# Patient Record
Sex: MALE | Race: WHITE | NOT HISPANIC OR LATINO | Employment: OTHER | URBAN - METROPOLITAN AREA
[De-identification: names, ages, dates, MRNs, and addresses within clinical notes are randomized per-mention and may not be internally consistent; named-entity substitution may affect disease eponyms.]

---

## 2017-01-03 ENCOUNTER — OFFICE VISIT (OUTPATIENT)
Dept: SLEEP MEDICINE | Facility: CLINIC | Age: 52
End: 2017-01-03
Attending: FAMILY MEDICINE
Payer: COMMERCIAL

## 2017-01-03 VITALS
HEIGHT: 71 IN | SYSTOLIC BLOOD PRESSURE: 126 MMHG | OXYGEN SATURATION: 97 % | BODY MASS INDEX: 29.4 KG/M2 | DIASTOLIC BLOOD PRESSURE: 86 MMHG | HEART RATE: 92 BPM | WEIGHT: 210 LBS

## 2017-01-03 DIAGNOSIS — R06.00 DYSPNEA AND RESPIRATORY ABNORMALITY: Primary | ICD-10-CM

## 2017-01-03 DIAGNOSIS — G47.9 SLEEP DISTURBANCE: ICD-10-CM

## 2017-01-03 DIAGNOSIS — I25.10 ATHEROSCLEROSIS OF NATIVE CORONARY ARTERY OF NATIVE HEART WITHOUT ANGINA PECTORIS: ICD-10-CM

## 2017-01-03 DIAGNOSIS — R06.89 DYSPNEA AND RESPIRATORY ABNORMALITY: Primary | ICD-10-CM

## 2017-01-03 DIAGNOSIS — R53.83 MALAISE AND FATIGUE: ICD-10-CM

## 2017-01-03 DIAGNOSIS — R53.81 MALAISE AND FATIGUE: ICD-10-CM

## 2017-01-03 DIAGNOSIS — G47.39 OTHER SLEEP APNEA: ICD-10-CM

## 2017-01-03 PROBLEM — F51.04 PSYCHOPHYSIOLOGICAL INSOMNIA: Status: ACTIVE | Noted: 2017-01-03

## 2017-01-03 PROCEDURE — 99244 OFF/OP CNSLTJ NEW/EST MOD 40: CPT | Performed by: PHYSICIAN ASSISTANT

## 2017-01-03 NOTE — NURSING NOTE
"Chief Complaint   Patient presents with     Sleep Problem     Consult Dr. Reyez snoring, witnessed apneas, headaches       Initial Ht 1.803 m (5' 11\")  Wt 95.255 kg (210 lb)  BMI 29.30 kg/m2 Estimated body mass index is 29.3 kg/(m^2) as calculated from the following:    Height as of this encounter: 1.803 m (5' 11\").    Weight as of this encounter: 95.255 kg (210 lb).  BP completed using cuff size: large    "

## 2017-01-03 NOTE — PROGRESS NOTES
Sleep Consultation:    Date on this visit: 1/3/2017    Robbie Holley  is sent  by Jagdeep Reyez for a sleep consultation.     Primary Physician: Jagdeep Reyez     Chief Complaint   Patient presents with     Sleep Problem     Consult Dr. Reyez snoring, witnessed apneas, headaches     HPI: Robbie Holley is a 51 year old male with medical history remarkable for CAD, chronic pain, MELISSA, depression, insomnia,  erectile dysfunction and hyperlipidemia. He presents in clinic today for evaluation of possible obstructive sleep apnea.      Robbie goes to sleep at 10:00 PM during the week. He wakes up at 6:00 AM with an alarm. He falls asleep in 10 minutes after taking 10 mg of Ambien.  Robbie denies difficulty falling asleep.  He wakes up 5-6 times a night for 1-5 minutes before falling back to sleep.  Robbie wakes up to uncertain reasons.  On weekends, Robbie goes to sleep at 10:00 PM.  He wakes up at 6:00 AM with an alarm. He falls asleep in 10 minutes.  Patient gets an average of 8 hours of sleep per night. He does not feer refreshed in the mornings.      Patient does watch TV in bed and does not use electronics in bed and read in bed.     Robbie does not do shift work.      Robbie does snore every night and snoring is very loud. Patient does have a regular bed partner. There is report of snoring.  He does have witnessed apneas. They never sleep separately.  Patient sleeps on his side. He has frequent morning dry mouth and morning headaches, denies morning confusion and restless legs. Robbie denies any bruxism, sleep walking, sleep talking, dream enactment, sleep paralysis, cataplexy and hypnogogic/hypnopompic hallucinations.    Robbie denies difficulty breathing through his nose, claustrophobia and reflux at night.      Robbie has gained 20 pounds in the last year.  Patient describes himself as a morning person.  He would prefer to go to sleep at 10:00 PM and wake up at 7:00 AM.  Patient's [place] Sleepiness score 2/24 inconsistent  with severe daytime sleepiness.  He has fatigue.     Robbie does take naps. He takes some inadvertant naps.  He denies closing eyes, dozing and falling asleep while driving.  Patient was counseled on the importance of driving while alert, to pull over if drowsy, or nap before getting into the vehicle if sleepy.  He uses 2 cups/day of coffee. Last caffeine intake is usually before noon.      Allergies:    Allergies   Allergen Reactions     Nka [No Known Allergies]        Medications:    Current Outpatient Prescriptions   Medication Sig Dispense Refill     FLUoxetine (PROZAC) 20 MG capsule Take 3 capsules (60 mg) by mouth daily 90 capsule 1     [START ON 2/19/2017] clonazePAM (KLONOPIN) 1 MG tablet Take 0.5 in the morning and 1 tablet at night as needed.  OK to fill this once but he needs follow-up with office visit prior to additional refills. 45 tablet 0     IBUPROFEN PO Take 400 mg by mouth every 4 hours as needed for moderate pain       zolpidem (AMBIEN) 10 MG tablet Take 1 tablet (10 mg) by mouth nightly as needed for sleep 30 tablet 3     simvastatin (ZOCOR) 40 MG tablet Take 1 tablet (40 mg) by mouth At Bedtime 90 tablet 3     aspirin 81 MG tablet Take 1 tablet by mouth daily. 100 tablet 3     nitroglycerin (NITROQUICK) 0.4 MG SL tablet Place 1 tablet (0.4 mg) under the tongue every 5 minutes as needed for chest pain 25 tablet prn       Problem List:  Patient Active Problem List    Diagnosis Date Noted     Psychophysiological insomnia 01/03/2017     Priority: Medium     Chronic pain syndrome 10/28/2016     Priority: Medium     Patient is followed by Jagdeep Reyez MD for ongoing prescription of pain medication.  All refills should be approved by this provider, or covering partner.    Medication(s): narcotics discontinued.. Still using clonazepam for anxiety and sleep.  Maximum quantity per month:   Clinic visit frequency required: Q 6  months     Controlled substance agreement:  Encounter-Level CSA - 10/13/15:                Controlled Substance Agreement - Scan on 12/17/2015  1:23 PM : CONTROLLED SUBSTANCE AGREEMENT 10/13/2015 (below)            Pain Clinic evaluation in the past: No    DIRE Total Score(s):  No flowsheet data found.    Last [facility] website verification:  done on 12/28/2016   [email or web address]          Colon polyps 09/21/2016     Priority: Medium     Multiple large tubular adenoma. Repeat colonoscopy in 3 years. [place]       Depression, major, recurrent, moderate (H) 05/20/2016     Priority: Medium     Narcotic withdrawal (H) 04/19/2016     Priority: Medium     Made it through acute withdrawal.        Rash and nonspecific skin eruption 03/20/2014     Priority: Medium     OA (osteoarthritis) of knee 02/17/2014     Priority: Medium     Needs replacement but on prasugrel so delaying until off.  Still active though.  Using Percocet 5-325 three times daily in the meantime initially now akhil to 45/month.  Looking at hip replacement.         Tobacco use disorder 11/06/2013     Priority: Medium     Trying to quit again. Concerned about anxiety/depression. Nicotine replacement.  Winter 2016.       Cervical pain 08/19/2013     Priority: Medium     S/P prosthetic total arthroplasty of the hip  LEFT 12/19/2011     Priority: Medium     Generalized anxiety disorder 02/25/2011     Priority: Medium     Patient is followed by MCKENZIE ADLER for ongoing prescription of anxiety med.  Med: Clonazepam.   Maximum use per 2-3 months: 19  Expected duration: ??  Narcotic agreement on file: YES - initiated February 25, 2011 (see related phone message)  Clinic visit recommended: Q 6  Months (with myself or with mental health provider)  Side effect(s) with zoloft, citalpram, venlafaxine.  Cymbalta not tried.         Hyperlipidemia LDL goal <70 10/31/2010     Priority: Medium     LDL       62   1/21/2014  LDL      106   12/14/2011  LDL       83   3/5/2009   Restarting simvastatin and recheck 3 months.        LT hand  fingernail onychomycosis 02/25/2008     Priority: Medium     Unable to take antifungals orally due to concurrent zocor  May 9, 2008 - Topicals and/or Penlac.       Generalized hyperhidrosis 09/26/2006     Priority: Medium     ERECTILE DYSFUNCTION 02/02/2006     Priority: Medium     February 2, 2006 - risks reviewed.  Levitra trial.       Coronary atherosclerosis 01/30/2006     Priority: Medium     S/P MI  September 26, 2006 - Doing well.  EKG: ok.  August 10, 2007 - Stable.  Encouraged smoking cessation.   September 28, 2007 - quit smoking.  Repeat stress echo normal..   February 13, 2008 - Recheck labs and adjust as needed.     02/05/2013, S/P recent Lt Ht cath, Lt vent angio, thrombectomy and stenting of proximal circumflex artery with drug eluting stent (proximal circumflex artery found to be 99% stenosed), done at [facility] on 01/16/2013, followed now by Dr Loja cardiology, [facility].  Problem list name updated by automated process. Provider to review       Hyperlipidemia 01/30/2006     Priority: Medium     LDL      117   09/26/2006     Goal <70            ALT       32   09/26/2006                  HDL       38   09/26/2006     Goal >40             AST       28   09/26/2006                  TRIG      116   09/26/2006    Goal <150                                                  August 10, 2007 - redraw and increase dose if not w/in goal.        Problem list name updated by automated process. Provider to review       health care home 12/14/2011     Priority: Low     EMERGENCY CARE PLAN  June 11, 2013: No current Care Coordination follow up planned. Please refer if Care Coordination services are needed.    Presenting Problem Signs and Symptoms Treatment Plan   Questions or concerns   during clinic hours   I will call my clinic directly:  [address] [id number] [phone].   Questions or concerns outside clinic hours   I will call the 24 hour  nurse line at   [phone] or [phone].   Need to schedule an appointment   I will call the 24 hour scheduling team at [phone] or my clinic directly at [phone].    Same day treatment     I will call my clinic first, nurse line if after hours, urgent care and express care if needed.   Clinic care coordination services (regular clinic hours)   I will call a clinic care coordinator directly:     Terrance Ty RN  Mon, Tues, Fri - [phone]  Wed, Thurs - [phone]    Cristina Castellanos :    [phone]    Or call my clinic at [phone] and ask to speak with care coordination.   Crisis Services: Behavioral or Mental Health Crisis Connection 24 Hour Phone Line  [phone]    [facility] 24 Hour Crisis Services  [phone]    [facility] [phone]    [facility]   [phone]       Emergency treatment -- Immediately    CAll 911               Past Medical/Surgical History:  Past Medical History   Diagnosis Date     Myocardial infarction (H)      Past Surgical History   Procedure Laterality Date     Surgical history of -   3/10/03     Left knee meniscal tear repair     Surgical history of -        elbow      Surgical history of -        hernia     Surgical history of -   2001     cardiac stent     C stress echo (treadmill) fl  4/22/02     Hernia repair, inguinal rt/lt  11/20/06     Left inguinal hernia repair. Dr Garza     Arthroplasty hip  12/19/2011     Procedure:ARTHROPLASTY HIP; Left Total Hip Arthroplasty; Surgeon:CHELITA BANKS; Location:[facility] OR     Arthroscopy knee with lateral meniscectomy  3/1/2013     Procedure: ARTHROSCOPY KNEE WITH LATERAL MENISCECTOMY;  Right Knee Arthroscopy & Lateral Meniscectomy & Microfracture;  Surgeon: Chelita Banks MD;  Location: [facility] OR       Social History:  Social History     Social History     Marital Status:      Spouse Name: N/A     Number of Children: N/A     Years of Education: N/A      Occupational History     Not on file.     Social History Main Topics     Smoking status: Former Smoker -- 1.00 packs/day for 25 years     Types: Cigarettes     Quit date: 12/31/2015     Smokeless tobacco: Never Used      Comment: 9/1/07     Alcohol Use: 0.0 oz/week     0 Standard drinks or equivalent per week      Comment: twice monthly     Drug Use: No     Sexual Activity:     Partners: Female     Other Topics Concern     Parent/Sibling W/ Cabg, Mi Or Angioplasty Before 65f 55m? Yes     mother MI age 45     Social History Narrative       Family History:  Family History   Problem Relation Age of Onset     C.A.D. Mother      passed from MI-first MI age 45     DIABETES Mother      CEREBROVASCULAR DISEASE Mother      Hypertension Father      Psychotic Disorder Father      anxiety     Breast Cancer No family hx of      Cancer - colorectal No family hx of      Prostate Cancer No family hx of      Psychotic Disorder Brother      anxiety     Sleep Apnea       4 brothers       Review of Systems:  A complete review of systems reviewed by me is negative with the exeption of what has been mentioned in the history of present illness.  CONSTITUTIONAL:  POSITIVE for  weight gain  EYES: NEGATIVE for changes in vision, blind spots, double vision.  ENT: NEGATIVE for ear pain, sore throat, sinus pain, post-nasal drip, runny nose, bloody nose  CARDIAC:  POSITIVE for  Heart disease  NEUROLOGIC:  POSITIVE for  headaches and weakness or numbness in the arms or legs  DERMATOLOGIC: NEGATIVE for rashes, new moles or change in mole(s)  PULMONARY:  POSITIVE for  SOB with activity and productive cough  GASTROINTESTINAL: NEGATIVE for nausea or vomitting, loose or watery stools, fat or grease in stools, constipation, abdominal pain, bowel movements black in color or blood noted.  GENITOURINARY: NEGATIVE for pain during urination, blood in urine, urinating more frequently than usual, irregular menstrual periods.  MUSCULOSKELETAL:  POSITIVE  "for  muscle pain  ENDOCRINE: NEGATIVE for increased thirst or urination, diabetes.  LYMPHATIC: NEGATIVE for swollen lymph nodes, lumps or bumps in the breasts or nipple discharge.    Physical Examination:  Vitals: /86 mmHg  Pulse 92  Ht 1.803 m (5' 11\")  Wt 95.255 kg (210 lb)  BMI 29.30 kg/m2  SpO2 97%  BMI= Body mass index is 29.3 kg/(m^2).         [place] Total Score 1/3/2017   Total score - [place] 2       GENERAL APPEARANCE: alert and no distress  EYES: Eyes grossly normal to inspection, PERRL and conjunctivae and sclerae normal  HENT: ear canals and TM's normal, nose and mouth without ulcers or lesions, oropharynx crowded and tongue base enlarged  NECK: no adenopathy, no asymmetry, masses, or scars and thyroid normal to palpation  RESP: lungs clear to auscultation - no rales, rhonchi or wheezes  CV: regular rates and rhythm, normal S1 S2, no S3 or S4 and no murmur, click or rub  LYMPHATICS: normal ant/post cervical and supraclavicular nodes  MS: extremities normal- no gross deformities noted  NEURO: Normal strength and tone, mentation intact and speech normal  PSYCH: mentation appears normal and affect normal/bright  Mallampati Class: IV.  Tonsillar Stage:     Last Basic Metabolic Panel:  NA      140   9/6/2016   POTASSIUM      4.5   9/6/2016  CHLORIDE      110   9/6/2016  NATALYA      8.9   9/6/2016  CO2       21   9/6/2016  BUN       11   9/6/2016  CR     0.78   9/6/2016  GLC       77   10/12/2016    TSH   Date Value Ref Range Status   10/12/2016 1.86 0.40 - 4.00 mU/L Final   ]      Impression:    Robbie Holley is a 51 year old male with medical history remarkable for CAD, chronic pain, MELISSA, depression, insomnia, erectile dysfunction and hyperlipidemia. He presents in clinic today for evaluation of possible obstructive sleep apnea.    Patient has features and risk factors for possible obstructive sleep apnea including: loud snoring, witnessed apnea, non-refreshing sleep, daytime fatigue, difficulty " maintaining sleep, morning headaches,  crowded oropharynx, strong family history of obstructive sleep apnea and co-morbid CAD. The STOP-BANG core is 5/8.     Plan:    1. Schedule a Home Sleep Apnea Testing to evaluate for obstructive sleep apnea.    2. Advised him against drowsy driving.    3. Recommend weight management.     Literature provided regarding sleep apnea and sleep hygiene.      He will follow up with me in approximately one day after his sleep study has been completed to review the results and discuss plan of care.       Home Sleep Apnea Testing  reviewed.  Obstructive sleep apnea reviewed.  Complications of untreated sleep apnea were reviewed.    I have spent 60 minutes with this patient today in which greater than 50% of this time was spent in the counseling / coordination of care regarding SILVANO.    Kellie Grant PA-C    CC: Jagdeep Reyez

## 2017-01-03 NOTE — MR AVS SNAPSHOT
After Visit Summary   1/3/2017    Robbie Holley    MRN: [id number]           Patient Information     Date Of Birth          1965        Visit Information        Provider Department      1/3/2017 11:00 AM Kellie Grant PA [facility]        Today's Diagnoses     Dyspnea and respiratory abnormality    -  1     Sleep disturbance         Atherosclerosis of native coronary artery of native heart without angina pectoris [I25.10]         Other sleep apnea [G47.39]         Malaise and fatigue           Care Instructions      Your BMI is Body mass index is 29.3 kg/(m^2).  Weight management is a personal decision.  If you are interested in exploring weight loss strategies, the following discussion covers the approaches that may be successful. Body mass index (BMI) is one way to tell whether you are at a healthy weight, overweight, or obese. It measures your weight in relation to your height.  A BMI of 18.5 to 24.9 is in the healthy range. A person with a BMI of 25 to 29.9 is considered overweight, and someone with a BMI of 30 or greater is considered obese. More than two-thirds of American adults are considered overweight or obese.  Being overweight or obese increases the risk for further weight gain. Excess weight may lead to heart disease and diabetes.  Creating and following plans for healthy eating and physical activity may help you improve your health.  Weight control is part of healthy lifestyle and includes exercise, emotional health, and healthy eating habits. Careful eating habits lifelong are the mainstay of weight control. Though there are significant health benefits from weight loss, long-term weight loss with diet alone may be very difficult to achieve- studies show long-term success with dietary management in less than 10% of people. Attaining a healthy weight may be especially difficult to achieve in those with severe obesity. In some cases, medications, devices and  "surgical management might be considered.  What can you do?  If you are overweight or obese and are interested in methods for weight loss, you should discuss this with your provider.     Consider reducing daily calorie intake by 500 calories.     Keep a food journal.     Avoiding skipping meals, consider cutting portions instead.    Diet combined with exercise helps maintain muscle while optimizing fat loss. Strength training is particularly important for building and maintaining muscle mass. Exercise helps reduce stress, increase energy, and improves fitness. Increasing exercise without diet control, however, may not burn enough calories to loose weight.       Start walking three days a week 10-20 minutes at a time    Work towards walking thirty minutes five days a week     Eventually, increase the speed of your walking for 1-2 minutes at time    In addition, we recommend that you review healthy lifestyles and methods for weight loss available through the National Institutes of Health patient information sites:  http://win.niddk.nih.gov/publications/index.htm    And look into health and wellness programs that may be available through your health insurance provider, employer, local community center, or dano club.    Weight management plan: Patient was referred to their PCP to discuss a diet and exercise plan.  Provider : Kellie Grant  Contact Information: [facility] [phone]  [place] Points:  1. What is Obstructive Sleep Apnea (SILVANO)? SILVANO is the most common type of sleep apnea. Apnea literally means, \"without breath.\" It is characterized by repetitive pauses in breathing, despite continued effort to breathe, and is usually associated with a reduction in blood oxygen saturation. Apneas can last 10 to over 60 seconds. It is caused by narrowing or collapse of the upper airway as muscles relax during sleep. A number of things can make apnea worse, including: sleeping on your back, having alcohol in " the evening, smoking, asthma, allergies, nasal congestion, and weight gain.  2. What are the consequences of SILVANO? Symptoms include: daytime sleepiness- possibly increasing the risk of falling asleep while driving, unrefreshing/restless sleep, snoring, insomnia, waking frequently to urinate, waking with heartburn or reflux, reduced concentration and memory, and morning headaches. Other health consequences may include development of high blood pressure. Untreated SILVANO also can contribute to heart disease, stroke and diabetes.   3. What are the treatment options? In most situations, sleep apnea is a lifelong disease that must be managed with daily therapy. Continuous Positive Airway (CPAP) is the most reliable treatment. A mouthguard to hold your jaw forward is usually the next most reliable option. Other options include postioning devices (to keep you off your back), nasal valves, tongue retaining device, weight loss, surgery. There is more detail about these options toward the end of this document.  4. What are the most important things to remember about using CPAP?     WHERE CAN I FIND MORE INFORMATION?    American Academy of Sleep Medicine Patient information on sleep disorders:  http://yoursleep.aasmnet.org    CPAP-  WHY AND HOW?                                 Continuous positive airway pressure, or CPAP, is the most effective treatment for obstructive sleep apnea. It works by using air pressure to hold your throat open. A decision to use CPAP is a major step forward in the pursuit of a healthier life. The successful use of CPAP will help you breathe easier, sleep better and live healthier. Using CPAP can be a positive experience if you keep these vick points in mind:  1. Commitment  CPAP is not a quick fix for your problem. It involves a long-term commitment to improve your sleep and your health.    2. Communication  Stay in close communication with both your sleep doctor and your CPAP supplier. Ask lots of  "questions and seek help when you need it.    3. Consistency  Use CPAP all night, every night and for every nap. You will receive the maximum health benefits from CPAP when you use it every time that you sleep. This will also make it easier for your body to adjust to the treatment.    4. Correction  The first machine and mask that you try may not be the best ones for you. Work with your sleep doctor and your CPAP supplier to make corrections to your equipment selection. Ask about trying a different type of machine or mask if you have ongoing problems. Make sure that your mask is a good fit and learn to use your equipment properly.    5. Challenge  Tell a family member or close friend to ask you each morning if you used your CPAP the previous night. Have someone to challenge you to give it your best effort.    6. Connection   Your adjustment to CPAP will be easier if you are able to connect with others who use the same treatment. Ask your sleep doctor if there is a support group in your area for people who have sleep apnea, or look for one on the Internet.  7. Comfort   Increase your level of comfort by using a saline spray, decongestant or heated humidifier if CPAP irritates your nose, mouth or throat. Use your unit's \"ramp\" setting to slowly get used to the air pressure level. There may be soft pads you can buy that will fit over your mask straps. Look on www.CPAP.com for accessories such as these straps, a pillow contoured for side-sleeping with CPAP, longer hoses, hose covers to reduce condensation, or stands to keep the hose out of your way.                   8. Cleaning   Clean your mask, tubing and headgear on a regular basis. Put this time in your schedule so that you don't forget to do it. Check and replace the filters for your CPAP unit and humidifier.    9. Completion   Although you are never finished with CPAP therapy, you should reward yourself by celebrating the completion of your first month of " treatment. Expect this first month to be your hardest period of adjustment. It will involve some trial and error as you find the machine, mask and pressure settings that are right for you.    10. Continuation  After your first month of treatment, continue to make a daily commitment to use your CPAP all night, every night and for every nap.    CPAP-Tips to starting with success:  Begin using your CPAP for short periods of time during the day while you watch TV or read. This eliminates the pressure of trying to fall asleep with it when it is still a new sensation.    Use CPAP every night and for every nap. Using it less often reduces the health benefits and makes it harder for your body to get used to it.    Newer CPAP models are virtually silent; however, if you find the sound of your CPAP machine to be bothersome, place the unit under your bed to dampen the sound.     Make small adjustments to your mask, tubing, straps and headgear until you get the right fit. Tightening the mask may actually worsen the leak.  If it leaves significant marks on your face or irritates the bridge of your nose, it may not be the best mask for you.  Speak with the person who supplied the mask and consider trying other masks. Insurances will allow you to try different masks during the first month of starting CPAP.  Insurance also covers a new mask, hose and filter about every 3-6 months.    Use a saline nasal spray to ease mild nasal congestion. Neti-Pot or saline nasal rinses may also help. Nasal gel sprays can help reduce nasal dryness.  Biotene mouthwash can be helpful to protect your teeth if you experience frequent dry mouth.  Dry mouth may be a sign of air escaping out of your mouth or out of the mask in the case of a full face mask.  Speak with your provider if you expect that is the case.     Take a nasal decongestant to relieve more severe nasal or sinus congestion.  Do not use Afrin (oxymetazoline) nasal spray more than 3 days  in a row.  Speak with your sleep doctor if your nasal congestion is chronic.    Use a heated humidifier that fits your CPAP model to enhance your breathing comfort. Adjust the heat setting up if you get a dry nose or throat, down if you get condensation in the hose or mask.  Position the CPAP lower than you so that any condensation in the hose drains back into the machine rather than towards the mask.    Try a system that uses nasal pillows if traditional masks give you problems.    Clean your mask, tubing and headgear once a week. Make sure the equipment dries fully.    Regularly check and replace the filters for your CPAP unit and humidifier.    Work closely with your sleep doctor and your CPAP supplier to make sure that you have the machine, mask and air pressure setting that works best for you.    BESIDES CPAP, WHAT OTHER THERAPIES ARE THERE?    Postioning devices if you only have the snoring or apnea while on your back    Dental devices if your condition is mild    Nasal valves may be effective though experience is limited    Weight loss if you are overweight    Surgery in limited cases where devices are not acceptable or there are problems with structures in the nose and throat  If treated with one of these alternative options, further evaluation is necessary to ensure that the therapy is effective. This may require some form of repeat testing.    Healthy Lifestyle:  Healthy diet, exercise and limit alcohol: Not only will excessive alcohol increase your weight over time, but it irritates the throat tissues and make them swell, shrinking the airway and causing snoring. Drinking alcohol should be limited and stopped within 3-4 hours before going to bed.   Stop smoking: (Red swollen throat, heat, nicotine), also irritates and swells the airway, among numerous other negative health consequences.  Positioning Device  This example shows a pillow that straps around the waist. It may be appropriate for those whose  sleep study shows milder sleep apnea that occurs primarily when lying flat on one's back. Preliminary studies have shown benefit but effectiveness at home should be verified.                      Oral Appliance  These are examples of two of many custom-made devices that are more likely to work in mild sleep apnea                                                Oral appliances are dental mouth pieces that fit very much like a sports mouth guards or removable orthodontic retainers. They are used to treat snoring and obstructive sleep apnea . The device prevents the airway from collapsing by either holding the tongue or supporting the jaw in a forward position. Since oral appliances are non-invasive and easy to use, they may be considered as an early treatment option. Oral appliance therapy (OAT) involves the customization, selection, fabrication, fitting, adjustments and long-term follow-up care of specially designed oral devices, worn during sleep, which reposition the lower jaw and tongue base forward to maintain an open airway.  Custom made oral appliances are proven to be more effective than over-the-counter devices. Therefore, the over-the-counter devices are recommended not to be used as a screening tool nor as a therapeutic option.     Who gets a dental device?  Oral appliance therapy can be used as an alternative to CPAP therapy for the treatment of mild to moderate sleep apnea and for those patients who prefer OAT to CPAP. Oral appliance therapy is a first line therapy for the treatment of primary snoring. Additionally, OAT is an option for those that cannot tolerate CPAP as therapy or who have experienced insufficient surgical results.                 Possible side effects?  Frequent but minor side effects include: excessive salivation, dry mouth, discomfort of teeth and jaw and temporary changes in the patient s bite.  Potential complications include: jaw pain, permanent occlusal changes and TMJ  symptoms.  The above mentioned side effects and complications can be recognized and managed by dentists trained in dental sleep medicine.   Finding a dentist that practices dental sleep medicine  Specific training is available through the American Academy of Dental Sleep Medicine for dentists interested in working in the field of sleep. To find a dentist who is educated in the field of sleep and the use of oral appliances, near you, visit the Web site of the American Academy of Dental Sleep Medicine; also see http://www.accpstorage.org/newOrganization/patients/oralAppliances.pdf   To search for a dentist certified in these practices:  Http://aadsm.org/FindADentist.aspx?1  Http://www.accpstorage.org/newOrganization/patients/oralAppliances.pdf    Weight Loss:    Some patients may experience reduction or elimination of sleep apnea with weight loss.  Though there are significant health benefits from weight loss, long-term weight loss is very difficult to achieve- studies show success with dietary management in less that 10% of people.     If you are interested in dietary weight loss, you should review the options discussed at the National Institutes of Health patient information site:     Http:/www.health.nih.gov/topic/WeightLossDieting    Bariatric programs offer counseling in all methods of weight loss:    Http:/www.uofedicalcenter.org/Specialties/WeightLossSurgeryandMedicalMgmt/htm    Surgery:  There are a number of surgeries that have been attempted to treat apnea. In general, surgical options are usually reserved for cases in which there is a physical abnormality contributing to obstruction or other treatment options are ineffective or not tolerated. Most surgical options are either unreliable or quite invasive. One of the more common procedures is:  Uvulopalatopharyngoplasty: In this procedure, the uvula (the finger-like tissue that hangs in the back of the throat), part of the soft palate (the tissue that the  "uvula is attached to), and sometimes the tonsils or adenoids are removed. The efficacy of this surgery is around 30-50% .  After surgery, complications may include:  Sleepiness and sleep apnea related to post-surgery medication   Swelling, infection and bleeding   A sore throat and/or difficulty swallowing   Drainage of secretions into the nose and a nasal quality to the voice. English language speech does not seem to be affected by this surgery.   Narrowing of the airway in the nose and throat (hence constricting breathing) snoring and even iatrogenically caused sleep apnea. By cutting the tissues, excess scar tissue can \"tighten\" the airway and make it even smaller than it was before UPPP.  Patients who have had the uvula removed will become unable to correctly speak Macedonian or any other language that has a uvular 'r' phoneme.    Surgeries to help resolve nasal congestion may help reduce the severity of apnea slightly. Nasal congestion does not cause apnea on its own, so these surgeries are usually not performed just for SILVANO.  They may be worth considering if the nasal congestion is significantly bothersome independent of apnea.            Follow-ups after your visit        Your next 10 appointments already scheduled     Jan 11, 2017  3:00 PM   HST  with SLEEP LAB, BED FIVE   [facility] ([facility]    [address] [address]   [phone]            Jan 12, 2017 11:00 AM   HST Drop Off with SLEEP LAB, BED FIVE   [facility] ([facility])    [address] [address]   [phone]            Jan 17, 2017  1:00 PM   Return Sleep Patient with DANELLE Martin   [facility] ([facility])    [address] [address]   [phone]              Future tests that were ordered for you today     Open Future Orders        Priority Expected Expires Ordered    " "HST-HOME SLEEP TEST/TYPE 3 JUDI Routine  2017 1/3/2017            Who to contact     If you have questions or need follow up information about today's clinic visit or your schedule please contact [facility] directly at [phone].  Normal or non-critical lab and imaging results will be communicated to you by MyChart, letter or phone within 4 business days after the clinic has received the results. If you do not hear from us within 7 days, please contact the clinic through MyChart or phone. If you have a critical or abnormal lab result, we will notify you by phone as soon as possible.  Submit refill requests through [employer]image or call your pharmacy and they will forward the refill request to us. Please allow 3 business days for your refill to be completed.          Additional Information About Your Visit        MyChart Information     [employer]image lets you send messages to your doctor, view your test results, renew your prescriptions, schedule appointments and more. To sign up, go to [email or web address] . Click on \"Log in\" on the left side of the screen, which will take you to the Welcome page. Then click on \"Sign up Now\" on the right side of the page.     You will be asked to enter the access code listed below, as well as some personal information. Please follow the directions to create your username and password.     Your access code is: [email or web address]  Expires: 3/21/2017  4:09 PM     Your access code will  in 90 days. If you need help or a new code, please call your [place] clinic or [phone].        Care EveryWhere ID     This is your Care EveryWhere ID. This could be used by other organizations to access your [place] medical records  [id number]        Your Vitals Were     Pulse Height BMI (Body Mass Index) Pulse Oximetry          92 1.803 m (5' 11\") 29.30 kg/m2 97%         Blood Pressure from Last 3 Encounters:   17 130/92   16 116/86   10/12/16 124/86    Weight from " Last 3 Encounters:   01/03/17 95.255 kg (210 lb)   12/21/16 95.255 kg (210 lb)   09/09/16 95.255 kg (210 lb)              We Performed the Following     SLEEP EVALUATION & MANAGEMENT REFERRAL - ADULT        Primary Care Provider Office Phone # Fax #    Jagdeep Reyez [phone]       [facility] [address]  [place] [address]        Thank you!     Thank you for choosing [facility]  for your care. Our goal is always to provide you with excellent care. Hearing back from our patients is one way we can continue to improve our services. Please take a few minutes to complete the written survey that you may receive in the mail after your visit with us. Thank you!             Your Updated Medication List - Protect others around you: Learn how to safely use, store and throw away your medicines at www.disposemymeds.org.          This list is accurate as of: 1/3/17 11:48 AM.  Always use your most recent med list.                   Brand Name Dispense Instructions for use    aspirin 81 MG tablet     100 tablet    Take 1 tablet by mouth daily.       clonazePAM 1 MG tablet   Start taking on:  2/19/2017    klonoPIN    45 tablet    Take 0.5 in the morning and 1 tablet at night as needed.  OK to fill this once but he needs follow-up with office visit prior to additional refills.       FLUoxetine 20 MG capsule    PROzac    90 capsule    Take 3 capsules (60 mg) by mouth daily       IBUPROFEN PO      Take 400 mg by mouth every 4 hours as needed for moderate pain       nitroglycerin 0.4 MG sublingual tablet    NITROQUICK    25 tablet    Place 1 tablet (0.4 mg) under the tongue every 5 minutes as needed for chest pain       simvastatin 40 MG tablet    ZOCOR    90 tablet    Take 1 tablet (40 mg) by mouth At Bedtime       zolpidem 10 MG tablet    AMBIEN    30 tablet    Take 1 tablet (10 mg) by mouth nightly as needed for sleep

## 2017-01-03 NOTE — PATIENT INSTRUCTIONS
Your BMI is Body mass index is 29.3 kg/(m^2).  Weight management is a personal decision.  If you are interested in exploring weight loss strategies, the following discussion covers the approaches that may be successful. Body mass index (BMI) is one way to tell whether you are at a healthy weight, overweight, or obese. It measures your weight in relation to your height.  A BMI of 18.5 to 24.9 is in the healthy range. A person with a BMI of 25 to 29.9 is considered overweight, and someone with a BMI of 30 or greater is considered obese. More than two-thirds of American adults are considered overweight or obese.  Being overweight or obese increases the risk for further weight gain. Excess weight may lead to heart disease and diabetes.  Creating and following plans for healthy eating and physical activity may help you improve your health.  Weight control is part of healthy lifestyle and includes exercise, emotional health, and healthy eating habits. Careful eating habits lifelong are the mainstay of weight control. Though there are significant health benefits from weight loss, long-term weight loss with diet alone may be very difficult to achieve- studies show long-term success with dietary management in less than 10% of people. Attaining a healthy weight may be especially difficult to achieve in those with severe obesity. In some cases, medications, devices and surgical management might be considered.  What can you do?  If you are overweight or obese and are interested in methods for weight loss, you should discuss this with your provider.     Consider reducing daily calorie intake by 500 calories.     Keep a food journal.     Avoiding skipping meals, consider cutting portions instead.    Diet combined with exercise helps maintain muscle while optimizing fat loss. Strength training is particularly important for building and maintaining muscle mass. Exercise helps reduce stress, increase energy, and improves fitness.  "Increasing exercise without diet control, however, may not burn enough calories to loose weight.       Start walking three days a week 10-20 minutes at a time    Work towards walking thirty minutes five days a week     Eventually, increase the speed of your walking for 1-2 minutes at time    In addition, we recommend that you review healthy lifestyles and methods for weight loss available through the National Institutes of Health patient information sites:  http://win.niddk.nih.gov/publications/index.htm    And look into health and wellness programs that may be available through your health insurance provider, employer, local community center, or dano club.    Weight management plan: Patient was referred to their PCP to discuss a diet and exercise plan.  Provider : Kellie Grant  Contact Information: [facility] [phone]  [place] Points:  1. What is Obstructive Sleep Apnea (SILVANO)? SILVANO is the most common type of sleep apnea. Apnea literally means, \"without breath.\" It is characterized by repetitive pauses in breathing, despite continued effort to breathe, and is usually associated with a reduction in blood oxygen saturation. Apneas can last 10 to over 60 seconds. It is caused by narrowing or collapse of the upper airway as muscles relax during sleep. A number of things can make apnea worse, including: sleeping on your back, having alcohol in the evening, smoking, asthma, allergies, nasal congestion, and weight gain.  2. What are the consequences of SILVANO? Symptoms include: daytime sleepiness- possibly increasing the risk of falling asleep while driving, unrefreshing/restless sleep, snoring, insomnia, waking frequently to urinate, waking with heartburn or reflux, reduced concentration and memory, and morning headaches. Other health consequences may include development of high blood pressure. Untreated SILVANO also can contribute to heart disease, stroke and diabetes.   3. What are the treatment options? " In most situations, sleep apnea is a lifelong disease that must be managed with daily therapy. Continuous Positive Airway (CPAP) is the most reliable treatment. A mouthguard to hold your jaw forward is usually the next most reliable option. Other options include postioning devices (to keep you off your back), nasal valves, tongue retaining device, weight loss, surgery. There is more detail about these options toward the end of this document.  4. What are the most important things to remember about using CPAP?     WHERE CAN I FIND MORE INFORMATION?    American Academy of Sleep Medicine Patient information on sleep disorders:  http://yoursleep.aasmnet.org    CPAP-  WHY AND HOW?                                 Continuous positive airway pressure, or CPAP, is the most effective treatment for obstructive sleep apnea. It works by using air pressure to hold your throat open. A decision to use CPAP is a major step forward in the pursuit of a healthier life. The successful use of CPAP will help you breathe easier, sleep better and live healthier. Using CPAP can be a positive experience if you keep these vick points in mind:  1. Commitment  CPAP is not a quick fix for your problem. It involves a long-term commitment to improve your sleep and your health.    2. Communication  Stay in close communication with both your sleep doctor and your CPAP supplier. Ask lots of questions and seek help when you need it.    3. Consistency  Use CPAP all night, every night and for every nap. You will receive the maximum health benefits from CPAP when you use it every time that you sleep. This will also make it easier for your body to adjust to the treatment.    4. Correction  The first machine and mask that you try may not be the best ones for you. Work with your sleep doctor and your CPAP supplier to make corrections to your equipment selection. Ask about trying a different type of machine or mask if you have ongoing problems. Make sure that  "your mask is a good fit and learn to use your equipment properly.    5. Challenge  Tell a family member or close friend to ask you each morning if you used your CPAP the previous night. Have someone to challenge you to give it your best effort.    6. Connection   Your adjustment to CPAP will be easier if you are able to connect with others who use the same treatment. Ask your sleep doctor if there is a support group in your area for people who have sleep apnea, or look for one on the Internet.  7. Comfort   Increase your level of comfort by using a saline spray, decongestant or heated humidifier if CPAP irritates your nose, mouth or throat. Use your unit's \"ramp\" setting to slowly get used to the air pressure level. There may be soft pads you can buy that will fit over your mask straps. Look on www.CPAP.com for accessories such as these straps, a pillow contoured for side-sleeping with CPAP, longer hoses, hose covers to reduce condensation, or stands to keep the hose out of your way.                   8. Cleaning   Clean your mask, tubing and headgear on a regular basis. Put this time in your schedule so that you don't forget to do it. Check and replace the filters for your CPAP unit and humidifier.    9. Completion   Although you are never finished with CPAP therapy, you should reward yourself by celebrating the completion of your first month of treatment. Expect this first month to be your hardest period of adjustment. It will involve some trial and error as you find the machine, mask and pressure settings that are right for you.    10. Continuation  After your first month of treatment, continue to make a daily commitment to use your CPAP all night, every night and for every nap.    CPAP-Tips to starting with success:  Begin using your CPAP for short periods of time during the day while you watch TV or read. This eliminates the pressure of trying to fall asleep with it when it is still a new sensation.    Use CPAP " every night and for every nap. Using it less often reduces the health benefits and makes it harder for your body to get used to it.    Newer CPAP models are virtually silent; however, if you find the sound of your CPAP machine to be bothersome, place the unit under your bed to dampen the sound.     Make small adjustments to your mask, tubing, straps and headgear until you get the right fit. Tightening the mask may actually worsen the leak.  If it leaves significant marks on your face or irritates the bridge of your nose, it may not be the best mask for you.  Speak with the person who supplied the mask and consider trying other masks. Insurances will allow you to try different masks during the first month of starting CPAP.  Insurance also covers a new mask, hose and filter about every 3-6 months.    Use a saline nasal spray to ease mild nasal congestion. Neti-Pot or saline nasal rinses may also help. Nasal gel sprays can help reduce nasal dryness.  Biotene mouthwash can be helpful to protect your teeth if you experience frequent dry mouth.  Dry mouth may be a sign of air escaping out of your mouth or out of the mask in the case of a full face mask.  Speak with your provider if you expect that is the case.     Take a nasal decongestant to relieve more severe nasal or sinus congestion.  Do not use Afrin (oxymetazoline) nasal spray more than 3 days in a row.  Speak with your sleep doctor if your nasal congestion is chronic.    Use a heated humidifier that fits your CPAP model to enhance your breathing comfort. Adjust the heat setting up if you get a dry nose or throat, down if you get condensation in the hose or mask.  Position the CPAP lower than you so that any condensation in the hose drains back into the machine rather than towards the mask.    Try a system that uses nasal pillows if traditional masks give you problems.    Clean your mask, tubing and headgear once a week. Make sure the equipment dries  fully.    Regularly check and replace the filters for your CPAP unit and humidifier.    Work closely with your sleep doctor and your CPAP supplier to make sure that you have the machine, mask and air pressure setting that works best for you.    BESIDES CPAP, WHAT OTHER THERAPIES ARE THERE?    Postioning devices if you only have the snoring or apnea while on your back    Dental devices if your condition is mild    Nasal valves may be effective though experience is limited    Weight loss if you are overweight    Surgery in limited cases where devices are not acceptable or there are problems with structures in the nose and throat  If treated with one of these alternative options, further evaluation is necessary to ensure that the therapy is effective. This may require some form of repeat testing.    Healthy Lifestyle:  Healthy diet, exercise and limit alcohol: Not only will excessive alcohol increase your weight over time, but it irritates the throat tissues and make them swell, shrinking the airway and causing snoring. Drinking alcohol should be limited and stopped within 3-4 hours before going to bed.   Stop smoking: (Red swollen throat, heat, nicotine), also irritates and swells the airway, among numerous other negative health consequences.  Positioning Device  This example shows a pillow that straps around the waist. It may be appropriate for those whose sleep study shows milder sleep apnea that occurs primarily when lying flat on one's back. Preliminary studies have shown benefit but effectiveness at home should be verified.                      Oral Appliance  These are examples of two of many custom-made devices that are more likely to work in mild sleep apnea                                                Oral appliances are dental mouth pieces that fit very much like a sports mouth guards or removable orthodontic retainers. They are used to treat snoring and obstructive sleep apnea . The device prevents the  airway from collapsing by either holding the tongue or supporting the jaw in a forward position. Since oral appliances are non-invasive and easy to use, they may be considered as an early treatment option. Oral appliance therapy (OAT) involves the customization, selection, fabrication, fitting, adjustments and long-term follow-up care of specially designed oral devices, worn during sleep, which reposition the lower jaw and tongue base forward to maintain an open airway.  Custom made oral appliances are proven to be more effective than over-the-counter devices. Therefore, the over-the-counter devices are recommended not to be used as a screening tool nor as a therapeutic option.     Who gets a dental device?  Oral appliance therapy can be used as an alternative to CPAP therapy for the treatment of mild to moderate sleep apnea and for those patients who prefer OAT to CPAP. Oral appliance therapy is a first line therapy for the treatment of primary snoring. Additionally, OAT is an option for those that cannot tolerate CPAP as therapy or who have experienced insufficient surgical results.                 Possible side effects?  Frequent but minor side effects include: excessive salivation, dry mouth, discomfort of teeth and jaw and temporary changes in the patient s bite.  Potential complications include: jaw pain, permanent occlusal changes and TMJ symptoms.  The above mentioned side effects and complications can be recognized and managed by dentists trained in dental sleep medicine.   Finding a dentist that practices dental sleep medicine  Specific training is available through the American Academy of Dental Sleep Medicine for dentists interested in working in the field of sleep. To find a dentist who is educated in the field of sleep and the use of oral appliances, near you, visit the Web site of the American Academy of Dental Sleep Medicine; also see  http://www.accpstorage.org/newOrganization/patients/oralAppliances.pdf   To search for a dentist certified in these practices:  Http://aadsm.org/FindADentist.aspx?1  Http://www.accpstorage.org/newOrganization/patients/oralAppliances.pdf    Weight Loss:    Some patients may experience reduction or elimination of sleep apnea with weight loss.  Though there are significant health benefits from weight loss, long-term weight loss is very difficult to achieve- studies show success with dietary management in less that 10% of people.     If you are interested in dietary weight loss, you should review the options discussed at the National Institutes of Health patient information site:     Http:/www.health.nih.gov/topic/WeightLossDieting    Bariatric programs offer counseling in all methods of weight loss:    Http:/www.uofedicalcenter.org/Specialties/WeightLossSurgeryandMedicalMgmt/htm    Surgery:  There are a number of surgeries that have been attempted to treat apnea. In general, surgical options are usually reserved for cases in which there is a physical abnormality contributing to obstruction or other treatment options are ineffective or not tolerated. Most surgical options are either unreliable or quite invasive. One of the more common procedures is:  Uvulopalatopharyngoplasty: In this procedure, the uvula (the finger-like tissue that hangs in the back of the throat), part of the soft palate (the tissue that the uvula is attached to), and sometimes the tonsils or adenoids are removed. The efficacy of this surgery is around 30-50% .  After surgery, complications may include:  Sleepiness and sleep apnea related to post-surgery medication   Swelling, infection and bleeding   A sore throat and/or difficulty swallowing   Drainage of secretions into the nose and a nasal quality to the voice. English language speech does not seem to be affected by this surgery.   Narrowing of the airway in the nose and throat (hence  "constricting breathing) snoring and even iatrogenically caused sleep apnea. By cutting the tissues, excess scar tissue can \"tighten\" the airway and make it even smaller than it was before UPPP.  Patients who have had the uvula removed will become unable to correctly speak Uzbek or any other language that has a uvular 'r' phoneme.    Surgeries to help resolve nasal congestion may help reduce the severity of apnea slightly. Nasal congestion does not cause apnea on its own, so these surgeries are usually not performed just for SILVANO.  They may be worth considering if the nasal congestion is significantly bothersome independent of apnea.      "

## 2017-01-16 DIAGNOSIS — F51.02 TRANSIENT INSOMNIA: Primary | ICD-10-CM

## 2017-01-16 NOTE — TELEPHONE ENCOUNTER
Zolpidem 10mg      Last Written Prescription Date:  8/31/16  Last Fill Quantity: 30,   # refills: 3  Last Office Visit with [facility] or [facility] prescribing provider: 12/21/16  Future Office visit:       Routing refill request to provider for review/approval because:  Drug not on the [facility], [facility] or [facility] refill protocol or controlled substance    Jihan Guerrero CPhT  [id number]) [address] [place] [address]  Phone: [phone]  Fax: [phone]

## 2017-01-17 RX ORDER — ZOLPIDEM TARTRATE 10 MG/1
10 TABLET ORAL
Qty: 30 TABLET | Refills: 3 | Status: SHIPPED | OUTPATIENT
Start: 2017-01-17 | End: 2017-05-10

## 2017-01-17 NOTE — TELEPHONE ENCOUNTER
Pt called pharmacy asking to route to Dr Aissatou rosas. He is leaving on vacation Thursday and would like to  meds on Wednesday 1/18/17  Thank you  Stella Valles [facility]  [phone]

## 2017-01-19 ENCOUNTER — TELEPHONE (OUTPATIENT)
Dept: FAMILY MEDICINE | Facility: CLINIC | Age: 52
End: 2017-01-19

## 2017-01-19 NOTE — TELEPHONE ENCOUNTER
Received PA request for Zolpidem 10mg from the [facility]  Pharmacy Rejection Note: 76 Plan limitations Exceeded    Sig:Take 1 tablet (10 mg) by mouth nightly as needed for sleep  Disp: 30 per 30  JEANNINE: no    No previous PA on file for this med.    Dx: Transient insomnia [F51.02]   Rationale: Tx of Transient insomnia [F51.02]     Provided Ins: [employer]  Provided Ins ID: [id number]  Provided Ins Phone # [phone] [employer] online ins verification  Member num  [id number]  Carrier Num [id number]  Acct Num [id number]  Group Num [id number]    PA submitted to [email or web address] via [employer], [employer]ke [id number]    Kemal Jim RT (r)  [facility]

## 2017-01-20 NOTE — TELEPHONE ENCOUNTER
Received response from [facility] [facility]        Response provided to the pharmacy, sent to be scanned.    Kemal Jim RT (r)  [facility]

## 2017-02-14 DIAGNOSIS — F41.1 GENERALIZED ANXIETY DISORDER: ICD-10-CM

## 2017-02-14 NOTE — TELEPHONE ENCOUNTER
Clonazepam      Last Written Prescription Date:  12/21/2016  Last Fill Quantity: 45,   # refills: 0  Last Office Visit with [facility] or  Health prescribing provider: 01/03/2017  Future Office visit:       Routing refill request to provider for review/approval because:  Drug not on the [facility], [facility] or [facility] refill protocol or controlled substance    Mai Rubi, [facility]  Float Technician  [facility]

## 2017-02-16 RX ORDER — CLONAZEPAM 1 MG/1
TABLET ORAL
Qty: 45 TABLET | Refills: 0 | Status: SHIPPED | OUTPATIENT
Start: 2017-02-19 | End: 2017-03-30

## 2017-02-17 NOTE — TELEPHONE ENCOUNTER
Scripted walked over to the [facility].    Linda Fowler, [place]

## 2017-03-30 DIAGNOSIS — F33.1 DEPRESSION, MAJOR, RECURRENT, MODERATE (H): ICD-10-CM

## 2017-03-30 DIAGNOSIS — F41.1 GENERALIZED ANXIETY DISORDER: ICD-10-CM

## 2017-03-30 RX ORDER — CLONAZEPAM 1 MG/1
TABLET ORAL
Qty: 45 TABLET | Refills: 0 | Status: SHIPPED | OUTPATIENT
Start: 2017-04-29 | End: 2017-03-30

## 2017-03-30 RX ORDER — CLONAZEPAM 1 MG/1
TABLET ORAL
Qty: 45 TABLET | Refills: 0 | Status: SHIPPED | OUTPATIENT
Start: 2017-05-29 | End: 2017-07-14

## 2017-03-30 RX ORDER — CLONAZEPAM 1 MG/1
TABLET ORAL
Qty: 45 TABLET | Refills: 0 | Status: SHIPPED | OUTPATIENT
Start: 2017-03-30 | End: 2017-03-30

## 2017-03-30 NOTE — TELEPHONE ENCOUNTER
Prozac 20mg     Last Written Prescription Date: 02/16/17  Last Fill Quantity: 45, # refills: 0  Last Office Visit with [facility] primary care provider:  12/21/16        Last PHQ-9 score on record=   PHQ-9 SCORE 10/12/2016   Total Score -   Total Score 14

## 2017-03-30 NOTE — TELEPHONE ENCOUNTER
Klonopin 1mg      Last Written Prescription Date:  02/16/17  Last Fill Quantity: 45,   # refills: 0  Last Office Visit with [facility] or [facility] prescribing provider: 12/21/16  Future Office visit:       Routing refill request to provider for review/approval because:  Drug not on the [facility], [facility] or [facility] refill protocol or controlled substance      Thank you -  Deepika Webb, Pharmacy Technician  [facility]  On Behalf Of [facility]

## 2017-03-31 NOTE — TELEPHONE ENCOUNTER
Script for clonazepam walked over to the [facility] pharmacy.    Linda Fowler, [place]

## 2017-04-14 ENCOUNTER — TELEPHONE (OUTPATIENT)
Dept: FAMILY MEDICINE | Facility: CLINIC | Age: 52
End: 2017-04-14

## 2017-04-14 NOTE — TELEPHONE ENCOUNTER
Panel Management Review      Patient has the following on his problem list:     Depression / Dysthymia review  PHQ-9 SCORE 5/19/2016 8/31/2016 10/12/2016   Total Score 22 - -   Total Score - 18 14      Patient is due for:  PHQ9      IVD   ASA: Passed    Last LDL:    Lab Results   Component Value Date    CHOL 154 12/21/2016     Lab Results   Component Value Date    HDL 35 12/21/2016     Lab Results   Component Value Date    LDL 87 12/21/2016     Lab Results   Component Value Date    TRIG 161 12/21/2016        Lab Results   Component Value Date    CHOLHDLRATIO 4.0 01/21/2014        Is the patient on a Statin? YES   Is the patient on Aspirin? YES                  Medications     HMG CoA Reductase Inhibitors    simvastatin (ZOCOR) 40 MG tablet    Salicylates    aspirin 81 MG tablet          Last three blood pressure readings:  BP Readings from Last 3 Encounters:   01/03/17 126/86   12/21/16 116/86   10/12/16 124/86        Tobacco History:     History   Smoking Status     Former Smoker     Packs/day: 1.00     Years: 25.00     Types: Cigarettes     Quit date: 12/31/2015   Smokeless Tobacco     Never Used     Comment: 9/1/07         Composite cancer screening  Chart review shows that this patient is due/due soon for the following None  Summary:    Patient is due/failing the following:   PHQ9    Action needed:   Patient needs to do PHQ9.    Type of outreach:    Sent letter. with PHQ9 and addressed envelope to return it    Questions for provider review:    None                                                                                                                                    Nubia MEJÍA       Chart routed to none.

## 2017-04-14 NOTE — LETTER
[facility]  [address] [address]  [phone]      April 14, 2017      Robbie Kishan  [address] [place] [id number]        Dear Robbie,     As part of [facility]'s commitment to health and wellness we have recently reviewed your chart and your medical record indicates that you are due for one or more of the following:    -- Questionnaire for depression. Please fill out the enclosed questionnaire and send it back to us in the stamped envelope provided. These questions are about your depression and how you have been feeling in the last 2 weeks. We need this form updated in your chart in order to continue to fill your medication. The score of this questionnaire helps to determine if your medications are working well. Thank you in advance for filling it out.    Working together for your health is our goal.    Thank you for choosing [facility] for your healthcare needs.    Sincerely,     Jagdeep Reyez MD / jacob

## 2017-05-02 ASSESSMENT — ANXIETY QUESTIONNAIRES
7. FEELING AFRAID AS IF SOMETHING AWFUL MIGHT HAPPEN: NOT AT ALL
2. NOT BEING ABLE TO STOP OR CONTROL WORRYING: SEVERAL DAYS
IF YOU CHECKED OFF ANY PROBLEMS ON THIS QUESTIONNAIRE, HOW DIFFICULT HAVE THESE PROBLEMS MADE IT FOR YOU TO DO YOUR WORK, TAKE CARE OF THINGS AT HOME, OR GET ALONG WITH OTHER PEOPLE: NOT DIFFICULT AT ALL
6. BECOMING EASILY ANNOYED OR IRRITABLE: NOT AT ALL
5. BEING SO RESTLESS THAT IT IS HARD TO SIT STILL: NOT AT ALL
1. FEELING NERVOUS, ANXIOUS, OR ON EDGE: SEVERAL DAYS
3. WORRYING TOO MUCH ABOUT DIFFERENT THINGS: SEVERAL DAYS
GAD7 TOTAL SCORE: 3

## 2017-05-02 ASSESSMENT — PATIENT HEALTH QUESTIONNAIRE - PHQ9: 5. POOR APPETITE OR OVEREATING: NOT AT ALL

## 2017-05-03 ASSESSMENT — PATIENT HEALTH QUESTIONNAIRE - PHQ9: SUM OF ALL RESPONSES TO PHQ QUESTIONS 1-9: 3

## 2017-05-03 ASSESSMENT — ANXIETY QUESTIONNAIRES: GAD7 TOTAL SCORE: 3

## 2017-05-08 DIAGNOSIS — E78.5 HYPERLIPIDEMIA LDL GOAL <70: ICD-10-CM

## 2017-05-09 NOTE — TELEPHONE ENCOUNTER
Simvastatin  40mg     Last Written Prescription Date: 04/26/2016  #90 x 3  Last filled 02/13/2017  Last Office Visit with [facility], [facility] or [facility] prescribing provider: 12/21/2016 KYRA Reyez       Lab Results   Component Value Date    CHOL 154 12/21/2016     Lab Results   Component Value Date    HDL 35 12/21/2016     Lab Results   Component Value Date    LDL 87 12/21/2016     Lab Results   Component Value Date    TRIG 161 12/21/2016     Lab Results   Component Value Date    CHOLHDLRATIO 4.0 01/21/2014

## 2017-05-10 DIAGNOSIS — F51.02 TRANSIENT INSOMNIA: ICD-10-CM

## 2017-05-10 RX ORDER — SIMVASTATIN 40 MG
TABLET ORAL
Qty: 90 TABLET | Refills: 1 | Status: SHIPPED | OUTPATIENT
Start: 2017-05-10 | End: 2017-08-18

## 2017-05-10 NOTE — TELEPHONE ENCOUNTER
Zolpidem      Last Written Prescription Date:  01/17/2017  Last Fill Quantity: 30,   # refills: 3  Last Office Visit with [facility], [facility] or  Health prescribing provider: 01/03/2017  Future Office visit:       Routing refill request to provider for review/approval because:  Drug not on the [facility], [facility] or [facility] refill protocol or controlled substance    Mai Rubi, [facility]  Float Technician  [place] [facility]

## 2017-05-11 RX ORDER — ZOLPIDEM TARTRATE 10 MG/1
10 TABLET ORAL
Qty: 30 TABLET | Refills: 3 | Status: SHIPPED | OUTPATIENT
Start: 2017-05-11 | End: 2017-08-18

## 2017-05-12 NOTE — TELEPHONE ENCOUNTER
Script walked over to the [facility].    Linda Fowler, [facility]

## 2017-07-14 DIAGNOSIS — F41.1 GENERALIZED ANXIETY DISORDER: ICD-10-CM

## 2017-07-14 RX ORDER — CLONAZEPAM 1 MG/1
TABLET ORAL
Qty: 45 TABLET | Refills: 0 | Status: SHIPPED | OUTPATIENT
Start: 2017-07-14 | End: 2017-08-14

## 2017-07-14 NOTE — TELEPHONE ENCOUNTER
Clonazepam 1 mg tablet      Last Written Prescription Date:  03/30/17  Last Fill Quantity: 45,   # refills: 0  Last Office Visit with [facility] or  Health prescribing provider: 12/21/16  Future Office visit:       Routing refill request to provider for review/approval because:  Drug not on the [facility] or  Health refill protocol or controlled substance    Raiza Evans CPhT  On Behalf of [facility]

## 2017-07-17 NOTE — TELEPHONE ENCOUNTER
Called patient and I let him know his Rx was being walked over to our Pharmacy here at [place].  Linda Barkley  [facility]

## 2017-08-07 ENCOUNTER — TELEPHONE (OUTPATIENT)
Dept: FAMILY MEDICINE | Facility: CLINIC | Age: 52
End: 2017-08-07

## 2017-08-07 NOTE — TELEPHONE ENCOUNTER
Dr. Reyez, please address refill request. Patient was last prescribed this in 6/19/09.  Lindy Villatoro RN

## 2017-08-07 NOTE — TELEPHONE ENCOUNTER
Patient is overdue for follow-up appointment. Need to have discussion about risks, precautions, etc.

## 2017-08-07 NOTE — TELEPHONE ENCOUNTER
Pt called requesting refill of Levitra to  pharmacy.      Thank you,  Nubia Zavala, [facility]

## 2017-08-14 DIAGNOSIS — F41.1 GENERALIZED ANXIETY DISORDER: ICD-10-CM

## 2017-08-14 NOTE — TELEPHONE ENCOUNTER
Clonazepam 1mg      Last Written Prescription Date:  7/14/17   Last Fill Quantity: 45,   # refills: 0  Last Office Visit with [facility] or [facility] prescribing provider: 12/21/16  Future Office visit:    Next 5 appointments (look out 90 days)     Aug 18, 2017  3:30 PM CDT   SHORT with Jagdeep Reyez MD   [facility] ([facility]    [address] [place] [id number] [phone]                   Routing refill request to provider for review/approval because:  Drug not on the [facility], [facility] or [facility] refill protocol or controlled substance          Thanks,  Linda Vences, Technician (float)  [facility]

## 2017-08-16 RX ORDER — CLONAZEPAM 1 MG/1
TABLET ORAL
Qty: 45 TABLET | Refills: 0 | Status: SHIPPED | OUTPATIENT
Start: 2017-08-16 | End: 2017-08-18

## 2017-08-18 ENCOUNTER — OFFICE VISIT (OUTPATIENT)
Dept: FAMILY MEDICINE | Facility: CLINIC | Age: 52
End: 2017-08-18
Payer: COMMERCIAL

## 2017-08-18 VITALS
WEIGHT: 210 LBS | BODY MASS INDEX: 29.4 KG/M2 | HEART RATE: 80 BPM | TEMPERATURE: 96 F | DIASTOLIC BLOOD PRESSURE: 80 MMHG | SYSTOLIC BLOOD PRESSURE: 100 MMHG | HEIGHT: 71 IN

## 2017-08-18 DIAGNOSIS — F41.1 GENERALIZED ANXIETY DISORDER: ICD-10-CM

## 2017-08-18 DIAGNOSIS — F51.02 TRANSIENT INSOMNIA: ICD-10-CM

## 2017-08-18 DIAGNOSIS — M79.675 PAIN OF TOE OF LEFT FOOT: Primary | ICD-10-CM

## 2017-08-18 DIAGNOSIS — F17.200 TOBACCO USE DISORDER: ICD-10-CM

## 2017-08-18 DIAGNOSIS — N52.9 ERECTILE DYSFUNCTION, UNSPECIFIED ERECTILE DYSFUNCTION TYPE: ICD-10-CM

## 2017-08-18 DIAGNOSIS — E78.5 HYPERLIPIDEMIA LDL GOAL <70: ICD-10-CM

## 2017-08-18 PROCEDURE — 99214 OFFICE O/P EST MOD 30 MIN: CPT | Performed by: FAMILY MEDICINE

## 2017-08-18 RX ORDER — CLONAZEPAM 1 MG/1
TABLET ORAL
Qty: 45 TABLET | Refills: 0 | Status: SHIPPED | OUTPATIENT
Start: 2017-10-17 | End: 2018-01-03

## 2017-08-18 RX ORDER — ZOLPIDEM TARTRATE 10 MG/1
10 TABLET ORAL
Qty: 30 TABLET | Refills: 5 | Status: SHIPPED | OUTPATIENT
Start: 2017-08-18

## 2017-08-18 RX ORDER — CLONAZEPAM 1 MG/1
TABLET ORAL
Qty: 45 TABLET | Refills: 0 | Status: SHIPPED | OUTPATIENT
Start: 2017-09-17 | End: 2017-08-18

## 2017-08-18 RX ORDER — CLONAZEPAM 1 MG/1
TABLET ORAL
Qty: 45 TABLET | Refills: 0 | Status: SHIPPED | OUTPATIENT
Start: 2017-08-18 | End: 2017-08-18

## 2017-08-18 RX ORDER — VARDENAFIL HYDROCHLORIDE 20 MG/1
10-20 TABLET ORAL DAILY PRN
Qty: 12 TABLET | Refills: 11 | Status: SHIPPED | OUTPATIENT
Start: 2017-08-18 | End: 2022-07-07

## 2017-08-18 RX ORDER — SIMVASTATIN 40 MG
40 TABLET ORAL AT BEDTIME
Qty: 90 TABLET | Refills: 3 | Status: SHIPPED | OUTPATIENT
Start: 2017-08-18 | End: 2022-02-25

## 2017-08-18 RX ORDER — SILDENAFIL CITRATE 20 MG/1
TABLET ORAL
Qty: 15 TABLET | Refills: 11 | Status: SHIPPED | OUTPATIENT
Start: 2017-08-18

## 2017-08-18 NOTE — PATIENT INSTRUCTIONS
Your left toe likely has osteoarthritis.      Less flexible shoes and avoid flexion and extension will help.     Arch support like SuperFeet insoles. Corinne Mtn and TAYLOR. May help some.     Podiatry might be able to inject it.     6 months refills on medications and then required follow-up visit.  Can do e-visit or phone visit too if more convenient.    Look into the acupuncture for smoking cessation.     Let me know if you want to try nicotine patch, gum or lozenge.     Fasting lipid panel sometime in the next 4 months.     Rocker shoes like the [employer] brand may help.

## 2017-08-18 NOTE — NURSING NOTE
"No chief complaint on file.      Initial /80  Pulse 80  Temp 96  F (35.6  C) (Tympanic)  Ht 5' 11\" (1.803 m)  Wt 210 lb (95.3 kg)  BMI 29.29 kg/m2 Estimated body mass index is 29.29 kg/(m^2) as calculated from the following:    Height as of this encounter: 5' 11\" (1.803 m).    Weight as of this encounter: 210 lb (95.3 kg).  Medication Reconciliation: complete     Layla Wilcox MA      "

## 2017-08-18 NOTE — PROGRESS NOTES
SUBJECTIVE:   Robbie Holley is a 51 year old male who presents to clinic today for the following health issues:      Hyperlipidemia Follow-Up      Rate your low fat/cholesterol diet?: not monitoring fat    Taking statin?  Yes, no muscle aches from statin    Other lipid medications/supplements?:  None  Lab Results   Component Value Date    LDL 87 12/21/2016       Depression and Anxiety Follow-Up    Status since last visit: Improved     Other associated symptoms:None    Complicating factors:     Significant life event: No     Current substance abuse: None    PHQ-9 SCORE 8/31/2016 10/12/2016 5/2/2017   Total Score - - -   Total Score 18 14 3     MELISSA-7 SCORE 8/31/2016 10/12/2016 5/2/2017   Total Score - - -   Total Score 19 16 3       PHQ-9  English  PHQ-9   Any Language  GAD7        Amount of exercise or physical activity: 2-3 days/week for an average of 45-60 minutes    Problems taking medications regularly: No    Medication side effects: none  Diet: regular (no restrictions)          Problem list and histories reviewed & adjusted, as indicated.  Additional history: as documented        Reviewed and updated as needed this visit by clinical staffTobacco  Allergies  Meds  Med Hx  Surg Hx  Fam Hx  Soc Hx      Reviewed and updated as needed this visit by Provider         1. Pain of toe of left foot - many months. Injury in past. Worse with dorsiflexion. Squats a fair amount.  Not intermittnetly red or swollen but generallly enlarged.    2. Erectile dysfunction, unspecified erectile dysfunction type - ongiong and found Viagra works.  Would liek to use again.    3. Generalized anxiety disorder    4. Transient insomnia    5. Hyperlipidemia LDL goal <70 - doing well.    6. Tobacco use disorder - did quit after his heart attack. Interested in quitting again. Used Nicotrol but didn't like having that in his mouth all day. Possibly other options?       PMH: Updated and/or reviewed in chart.    PSH: Updated and/or reviewed  "in chart.    Family History: Updated and/or reviewed in chart.     ROS:  Constitutional, neuro, EMT, endocrine, pulmonary, cardiac, gastrointestinal, genitourinary, musculoskeletal, integument and psychiatric systems are otherwise negative.    OBJECTIVE:                                                    /80  Pulse 80  Temp 96  F (35.6  C) (Tympanic)  Ht 5' 11\" (1.803 m)  Wt 210 lb (95.3 kg)  BMI 29.29 kg/m2  GENERAL: Pleasant and interactive.  Alert and oriented x 3.  No acute distress.  PSYCH: Alert and oriented times 3; coherent speech, normal rate and volume, able to articulate logical thoughts, able to abstract reason, no tangential thoughts, no hallucinations or delusions  His affect is normal.  EXTREM: Great toe on effected foot has normal alignment and vascularity.  metatarsophalangeal joint is enlarged and tender. Minimal erythema.  Reduced range of motion and tender at extremes.   Results for orders placed or performed in visit on 12/21/16   Hepatitis C Screen Reflex to HCV RNA Quant and Genotype   Result Value Ref Range    Hepatitis C Antibody  NR     Nonreactive   Assay performance characteristics have not been established for newborns,   infants, and children     Drug abuse screen (NL, RW)   Result Value Ref Range    Methamphetamine Qual Urine Negative NEG    Cocaine Qual Urine Negative NEG    Cannabinoids Qual Urine Negative NEG    MDMA Qual Urine Negative NEG    Methadone Qual Urine Negative NEG    Opiates Qualitative Urine Negative NEG    Benzodiazepine Qual Urine Negative NEG    Tricyc Anti Qual Urine Negative NEG    Barbiturates Qual Urine Negative NEG    PCP Qual Urine Negative NEG    Amphetamine Qual Urine Negative NEG    Oxycodone Qual Urine Negative NEG   Lipid panel reflex to direct LDL   Result Value Ref Range    Cholesterol 154 <200 mg/dL    Triglycerides 161 (H) <150 mg/dL    HDL Cholesterol 35 (L) >39 mg/dL    LDL Cholesterol Calculated 87 <100 mg/dL    Non HDL Cholesterol 119 " <130 mg/dL      ASSESSMENT/PLAN:                                                        ICD-10-CM    1. Pain of toe of left foot M79.675 ORTHO  REFERRAL   2. Erectile dysfunction, unspecified erectile dysfunction type N52.9 sildenafil (REVATIO/VIAGRA) 20 MG tablet     vardenafil (LEVITRA) 20 MG tablet   3. Generalized anxiety disorder F41.1 clonazePAM (KLONOPIN) 1 MG tablet     DISCONTINUED: clonazePAM (KLONOPIN) 1 MG tablet     DISCONTINUED: clonazePAM (KLONOPIN) 1 MG tablet   4. Transient insomnia F51.02 zolpidem (AMBIEN) 10 MG tablet   5. Hyperlipidemia LDL goal <70 E78.5 simvastatin (ZOCOR) 40 MG tablet     Lipid panel reflex to direct LDL   6. Tobacco use disorder F17.200        Care plan updated in chart for chronic problems.    Patient Instructions     Your left toe likely has osteoarthritis.      Less flexible shoes and avoid flexion and extension will help.     Arch support like SuperFeet insoles. Gander Mtn and TAYLOR. May help some.     Podiatry might be able to inject it.     6 months refills on medications and then required follow-up visit.  Can do e-visit or phone visit too if more convenient.    Look into the acupuncture for smoking cessation.     Let me know if you want to try nicotine patch, gum or lozenge.     Fasting lipid panel sometime in the next 4 months.     Rocker shoes like the [employer] brand may help.      Orders Placed This Encounter     Lipid panel reflex to direct LDL     ORTHO  REFERRAL     sildenafil (REVATIO/VIAGRA) 20 MG tablet     vardenafil (LEVITRA) 20 MG tablet     DISCONTD: clonazePAM (KLONOPIN) 1 MG tablet     zolpidem (AMBIEN) 10 MG tablet     simvastatin (ZOCOR) 40 MG tablet     DISCONTD: clonazePAM (KLONOPIN) 1 MG tablet     clonazePAM (KLONOPIN) 1 MG tablet        See Patient Instructions    Jagdeep Ryeez MD

## 2017-08-18 NOTE — MR AVS SNAPSHOT
After Visit Summary   8/18/2017    Robbie Holley    MRN: [id number]           Patient Information     Date Of Birth          1965        Visit Information        Provider Department      8/18/2017 3:30 PM Jagdeep Reyez MD [facility]        Today's Diagnoses     Pain of toe of left foot    -  1    Erectile dysfunction, unspecified erectile dysfunction type        Generalized anxiety disorder        Transient insomnia        Hyperlipidemia LDL goal <70        Tobacco use disorder          Care Instructions      Your left toe likely has osteoarthritis.      Less flexible shoes and avoid flexion and extension will help.     Arch support like SuperFeet insoles. Gander Mtn and TAYLOR. May help some.     Podiatry might be able to inject it.     6 months refills on medications and then required follow-up visit.  Can do e-visit or phone visit too if more convenient.    Look into the acupuncture for smoking cessation.     Let me know if you want to try nicotine patch, gum or lozenge.     Fasting lipid panel sometime in the next 4 months.     Rocker shoes like the [employer]oka brand may help.           Follow-ups after your visit        Additional Services     ORTHO  REFERRAL       [facility] is referring you to the Orthopedic  Services at [facility].       The  Representative will assist you in the coordination of your Orthopedic and Musculoskeletal Care as prescribed by your physician.  Looking for injections of great toe, MTP and possibly insole.    The  Representative will call you within 1 business day to help schedule your appointment, or you may contact the  Representative at:    All areas ~ [phone]     Type of Referral : [facility]       Timeframe requested: routine.     Coverage of these services is subject to the terms and limitations of your health insurance plan.   "Please call member services at your health plan with any benefit or coverage questions.      If X-rays, CT or MRI's have been performed, please contact the facility where they were done to arrange for , prior to your scheduled appointment.  Please bring this referral request to your appointment and present it to your specialist.                  Follow-up notes from your care team     Return in about 6 months (around 2/18/2018).      Future tests that were ordered for you today     Open Future Orders        Priority Expected Expires Ordered    Lipid panel reflex to direct LDL Routine 8/19/2017 8/18/2018 8/18/2017            Who to contact     Normal or non-critical lab and imaging results will be communicated to you by [employer], letter or phone within 4 business days after the clinic has received the results. If you do not hear from us within 7 days, please contact the clinic through [employer] or phone. If you have a critical or abnormal lab result, we will notify you by phone as soon as possible.  Submit refill requests through [employer] or call your pharmacy and they will forward the refill request to us. Please allow 3 business days for your refill to be completed.          If you need to speak with a  for additional information , please call: [phone]           Additional Information About Your Visit        [employer] Information     [employer] lets you send messages to your doctor, view your test results, renew your prescriptions, schedule appointments and more. To sign up, go to [email or web address] . Click on \"Log in\" on the left side of the screen, which will take you to the Welcome page. Then click on \"Sign up Now\" on the right side of the page.     You will be asked to enter the access code listed below, as well as some personal information. Please follow the directions to create your username and password.     Your access code is: [email or web address]  Expires: 11/16/2017  4:14 PM     Your " "access code will  in 90 days. If you need help or a new code, please call your [place] clinic or [phone].        Care EveryWhere ID     This is your Care EveryWhere ID. This could be used by other organizations to access your [place] medical records  [id number]        Your Vitals Were     Pulse Temperature Height BMI (Body Mass Index)          80 96  F (35.6  C) (Tympanic) 5' 11\" (1.803 m) 29.29 kg/m2         Blood Pressure from Last 3 Encounters:   17 100/80   17 126/86   16 116/86    Weight from Last 3 Encounters:   17 210 lb (95.3 kg)   17 210 lb (95.3 kg)   16 210 lb (95.3 kg)              We Performed the Following     ORTHO  REFERRAL          Today's Medication Changes          These changes are accurate as of: 17  4:15 PM.  If you have any questions, ask your nurse or doctor.               Start taking these medicines.        Dose/Directions    clonazePAM 1 MG tablet   Commonly known as:  klonoPIN   Used for:  Generalized anxiety disorder   Started by:  Jagdeep Reyez MD        Start taking on:  10/17/2017   Take 0.5 in the morning and 1 tablet at night as needed.  OK to fill this once but he needs follow-up with office visit prior to additional refills.   Quantity:  45 tablet   Refills:  0       sildenafil 20 MG tablet   Commonly known as:  REVATIO/VIAGRA   Used for:  Erectile dysfunction, unspecified erectile dysfunction type   Started by:  Jagdeep Reyez MD        Take 1 tab prior to intercourse no more than once daily. May increase at subsequent doses by 1 tab with each subsequent ineffective dose up to a maximum dosage of 5 tablets.   Quantity:  15 tablet   Refills:  11       vardenafil 20 MG tablet   Commonly known as:  LEVITRA   Used for:  Erectile dysfunction, unspecified erectile dysfunction type   Started by:  Jagdeep Reyez MD        Dose:  10-20 mg   Take 0.5-1 tablets (10-20 mg) by mouth daily as needed for erectile " dysfunction Never use with nitroglycerin, terazosin or doxazosin.   Quantity:  12 tablet   Refills:  11         These medicines have changed or have updated prescriptions.        Dose/Directions    simvastatin 40 MG tablet   Commonly known as:  ZOCOR   This may have changed:  See the new instructions.   Used for:  Hyperlipidemia LDL goal <70   Changed by:  Jagdeep Reyez MD        Dose:  40 mg   Take 1 tablet (40 mg) by mouth At Bedtime   Quantity:  90 tablet   Refills:  3            Where to get your medicines      These medications were sent to [place], [place] - [address]  [address] [facility] [address]     Phone:  [phone]     simvastatin 40 MG tablet    vardenafil 20 MG tablet         Some of these will need a paper prescription and others can be bought over the counter.  Ask your nurse if you have questions.     Bring a paper prescription for each of these medications     clonazePAM 1 MG tablet    sildenafil 20 MG tablet    zolpidem 10 MG tablet                Primary Care Provider Office Phone # Fax #    Jagdeep Reyez [phone]       [address] [facility] [address]        Equal Access to Services     [facility]: Hadii aad ku hadasho Soomaali, waaxda luqadaha, qaybta kaalmada adenikkiyada, kaylin dietz . So [facility] [phone].    ATENCIÓN: Si habla español, tiene a jenkins disposición servicios gratuitos de asistencia lingüística. [facility] [phone].    We comply with applicable federal civil rights laws and [place] laws. We do not discriminate on the basis of race, color, national origin, age, disability sex, sexual orientation or gender identity.            Thank you!     Thank you for choosing [facility]  for your care. Our goal is always to provide you with excellent care. Hearing back from our patients is one way we can continue to improve our services. Please take a few  minutes to complete the written survey that you may receive in the mail after your visit with us. Thank you!             Your Updated Medication List - Protect others around you: Learn how to safely use, store and throw away your medicines at www.disposemymeds.org.          This list is accurate as of: 8/18/17  4:15 PM.  Always use your most recent med list.                   Brand Name Dispense Instructions for use Diagnosis    aspirin 81 MG tablet     100 tablet    Take 1 tablet by mouth daily.    Coronary atherosclerosis of unspecified type of vessel, native or graft       clonazePAM 1 MG tablet   Start taking on:  10/17/2017    klonoPIN    45 tablet    Take 0.5 in the morning and 1 tablet at night as needed.  OK to fill this once but he needs follow-up with office visit prior to additional refills.    Generalized anxiety disorder       IBUPROFEN PO      Take 400 mg by mouth every 4 hours as needed for moderate pain        nitroGLYcerin 0.4 MG sublingual tablet    NITROQUICK    25 tablet    Place 1 tablet (0.4 mg) under the tongue every 5 minutes as needed for chest pain    Coronary atherosclerosis of unspecified type of vessel, native or graft       sildenafil 20 MG tablet    REVATIO/VIAGRA    15 tablet    Take 1 tab prior to intercourse no more than once daily. May increase at subsequent doses by 1 tab with each subsequent ineffective dose up to a maximum dosage of 5 tablets.    Erectile dysfunction, unspecified erectile dysfunction type       simvastatin 40 MG tablet    ZOCOR    90 tablet    Take 1 tablet (40 mg) by mouth At Bedtime    Hyperlipidemia LDL goal <70       vardenafil 20 MG tablet    LEVITRA    12 tablet    Take 0.5-1 tablets (10-20 mg) by mouth daily as needed for erectile dysfunction Never use with nitroglycerin, terazosin or doxazosin.    Erectile dysfunction, unspecified erectile dysfunction type       zolpidem 10 MG tablet    AMBIEN    30 tablet    Take 1 tablet (10 mg) by mouth nightly as  needed for sleep    Transient insomnia

## 2017-11-30 ENCOUNTER — COMMUNICATION - HEALTHEAST (OUTPATIENT)
Dept: SCHEDULING | Facility: CLINIC | Age: 52
End: 2017-11-30

## 2017-12-01 ENCOUNTER — TRANSFERRED RECORDS (OUTPATIENT)
Dept: HEALTH INFORMATION MANAGEMENT | Facility: CLINIC | Age: 52
End: 2017-12-01

## 2017-12-01 ASSESSMENT — MIFFLIN-ST. JEOR: SCORE: 1850.04

## 2017-12-02 ASSESSMENT — MIFFLIN-ST. JEOR
SCORE: 1865.92
SCORE: 1879.98

## 2017-12-03 ASSESSMENT — MIFFLIN-ST. JEOR: SCORE: 1876.35

## 2017-12-04 ENCOUNTER — TRANSFERRED RECORDS (OUTPATIENT)
Dept: HEALTH INFORMATION MANAGEMENT | Facility: CLINIC | Age: 52
End: 2017-12-04

## 2017-12-04 ENCOUNTER — SURGERY - HEALTHEAST (OUTPATIENT)
Dept: CARDIOLOGY | Facility: CLINIC | Age: 52
End: 2017-12-04

## 2017-12-04 ASSESSMENT — MIFFLIN-ST. JEOR: SCORE: 1889.05

## 2017-12-18 ENCOUNTER — COMMUNICATION - HEALTHEAST (OUTPATIENT)
Dept: CARDIOLOGY | Facility: CLINIC | Age: 52
End: 2017-12-18

## 2017-12-18 DIAGNOSIS — I25.118 CORONARY ARTERY DISEASE OF NATIVE ARTERY OF NATIVE HEART WITH STABLE ANGINA PECTORIS (H): ICD-10-CM

## 2017-12-27 ENCOUNTER — COMMUNICATION - HEALTHEAST (OUTPATIENT)
Dept: CARDIOLOGY | Facility: CLINIC | Age: 52
End: 2017-12-27

## 2017-12-27 DIAGNOSIS — I25.118 CORONARY ARTERY DISEASE OF NATIVE ARTERY OF NATIVE HEART WITH STABLE ANGINA PECTORIS (H): ICD-10-CM

## 2017-12-28 ENCOUNTER — AMBULATORY - HEALTHEAST (OUTPATIENT)
Dept: CARDIOLOGY | Facility: CLINIC | Age: 52
End: 2017-12-28

## 2017-12-28 DIAGNOSIS — I25.10 CAD (CORONARY ARTERY DISEASE): ICD-10-CM

## 2017-12-28 DIAGNOSIS — I25.118 CORONARY ARTERY DISEASE OF NATIVE ARTERY OF NATIVE HEART WITH STABLE ANGINA PECTORIS (H): ICD-10-CM

## 2018-01-03 DIAGNOSIS — F41.1 GENERALIZED ANXIETY DISORDER: ICD-10-CM

## 2018-01-03 NOTE — TELEPHONE ENCOUNTER
Routing refill request to provider for review/approval because:  Drug not on the [facility] refill protocol     Odilia Flores RN        Will forward to pool as will need to be cosigned. Has been stable. Needs follow-up.

## 2018-01-12 RX ORDER — CLONAZEPAM 1 MG/1
TABLET ORAL
Qty: 45 TABLET | Refills: 0 | Status: SHIPPED | OUTPATIENT
Start: 2018-01-12

## 2018-02-01 DIAGNOSIS — F41.1 GENERALIZED ANXIETY DISORDER: ICD-10-CM

## 2018-02-01 RX ORDER — CLONAZEPAM 1 MG/1
TABLET ORAL
Qty: 45 TABLET | Refills: 0 | Status: CANCELLED | OUTPATIENT
Start: 2018-02-01

## 2018-02-02 NOTE — TELEPHONE ENCOUNTER
This was just filled on 1/12 by Dr Schroeder and there was a note that he needs a visit.  Please call pt, he needs to schedule    He is not even due for a refill until 2/10    Janette Zheng

## 2018-02-02 NOTE — TELEPHONE ENCOUNTER
Routing refill request to provider for review/approval because:  Drug not on the [facility] refill protocol  Lucero Hampton RN

## 2018-02-13 ENCOUNTER — COMMUNICATION - HEALTHEAST (OUTPATIENT)
Dept: CARDIOLOGY | Facility: CLINIC | Age: 53
End: 2018-02-13

## 2018-11-05 ENCOUNTER — ALLIED HEALTH/NURSE VISIT (OUTPATIENT)
Dept: FAMILY MEDICINE | Facility: CLINIC | Age: 53
End: 2018-11-05

## 2018-11-05 VITALS — SYSTOLIC BLOOD PRESSURE: 134 MMHG | DIASTOLIC BLOOD PRESSURE: 84 MMHG

## 2018-11-05 DIAGNOSIS — Z01.30 BP CHECK: Primary | ICD-10-CM

## 2018-11-05 PROCEDURE — 99207 ZZC NO CHARGE NURSE ONLY: CPT

## 2018-11-05 NOTE — MR AVS SNAPSHOT
"              After Visit Summary   2018    Robbie Holley    MRN: [id number]           Patient Information     Date Of Birth          1965        Visit Information        Provider Department      2018 1:18 PM [facility] [place]        Today's Diagnoses     BP check    -  1       Follow-ups after your visit        Who to contact     Normal or non-critical lab and imaging results will be communicated to you by [email or web address], letter or phone within 4 business days after the clinic has received the results. If you do not hear from us within 7 days, please contact the clinic through MyChart or phone. If you have a critical or abnormal lab result, we will notify you by phone as soon as possible.  Submit refill requests through [employer] or call your pharmacy and they will forward the refill request to us. Please allow 3 business days for your refill to be completed.          If you need to speak with a  for additional information , please call: [phone]           Additional Information About Your Visit        [employer] Information     [employer] lets you send messages to your doctor, view your test results, renew your prescriptions, schedule appointments and more. To sign up, go to [email or web address] . Click on \"Log in\" on the left side of the screen, which will take you to the Welcome page. Then click on \"Sign up Now\" on the right side of the page.     You will be asked to enter the access code listed below, as well as some personal information. Please follow the directions to create your username and password.     Your access code is: [email or web address]  Expires: 2/3/2019  1:22 PM     Your access code will  in 90 days. If you need help or a new code, please call your [place] clinic or [phone].        Care EveryWhere ID     This is your Care EveryWhere ID. This could be used by other organizations to access your [place] medical records  [id number]   "       Blood Pressure from Last 3 Encounters:   11/05/18 134/84   08/18/17 100/80   01/03/17 126/86    Weight from Last 3 Encounters:   08/18/17 210 lb (95.3 kg)   01/03/17 210 lb (95.3 kg)   12/21/16 210 lb (95.3 kg)              Today, you had the following     No orders found for display       Primary Care Provider    None Specified       No primary provider on file.        Equal Access to Services     OUMAR FLORES : Hadii luis melendrezo Sojessie, waaxda luqadaha, qaybta kaalmada adeegyada, kaylin dietz . So [facility] [phone].    ATENCIÓN: Si habla esppaige, tiene a jenkins disposición servicios gratuitos de asistencia lingüística. Llame al [phone].    We comply with applicable federal civil rights laws and [place] laws. We do not discriminate on the basis of race, color, national origin, age, disability, sex, sexual orientation, or gender identity.            Thank you!     Thank you for choosing [facility]  for your care. Our goal is always to provide you with excellent care. Hearing back from our patients is one way we can continue to improve our services. Please take a few minutes to complete the written survey that you may receive in the mail after your visit with us. Thank you!             Your Updated Medication List - Protect others around you: Learn how to safely use, store and throw away your medicines at www.disposemymeds.org.          This list is accurate as of 11/5/18  1:22 PM.  Always use your most recent med list.                   Brand Name Dispense Instructions for use Diagnosis    aspirin 81 MG tablet     100 tablet    Take 1 tablet by mouth daily.    Coronary atherosclerosis of unspecified type of vessel, native or graft       clonazePAM 1 MG tablet    klonoPIN    45 tablet    Take 0.5 in the morning and 1 tablet at night as needed.  OK to fill this once but he needs follow-up with office visit prior to additional refills.    Generalized anxiety  disorder       IBUPROFEN PO      Take 400 mg by mouth every 4 hours as needed for moderate pain        nitroGLYcerin 0.4 MG sublingual tablet    NITROQUICK    25 tablet    Place 1 tablet (0.4 mg) under the tongue every 5 minutes as needed for chest pain    Coronary atherosclerosis of unspecified type of vessel, native or graft       sildenafil 20 MG tablet    REVATIO    15 tablet    Take 1 tab prior to intercourse no more than once daily. May increase at subsequent doses by 1 tab with each subsequent ineffective dose up to a maximum dosage of 5 tablets.    Erectile dysfunction, unspecified erectile dysfunction type       simvastatin 40 MG tablet    ZOCOR    90 tablet    Take 1 tablet (40 mg) by mouth At Bedtime    Hyperlipidemia LDL goal <70       vardenafil 20 MG tablet    LEVITRA    12 tablet    Take 0.5-1 tablets (10-20 mg) by mouth daily as needed for erectile dysfunction Never use with nitroglycerin, terazosin or doxazosin.    Erectile dysfunction, unspecified erectile dysfunction type       zolpidem 10 MG tablet    AMBIEN    30 tablet    Take 1 tablet (10 mg) by mouth nightly as needed for sleep    Transient insomnia

## 2018-11-05 NOTE — PROGRESS NOTES
Robbie Holley is enrolled/participating in the retail pharmacy Blood Pressure Goals Achievement Program (BPGAP).  Robbie Holley was evaluated at [facility] on November 5, 2018 at which time his blood pressure was:    BP Readings from Last 3 Encounters:   11/05/18 134/84   08/18/17 100/80   01/03/17 126/86     Reviewed lifestyle modifications for blood pressure control and reduction: including making healthy food choices, managing weight, getting regular exercise, smoking cessation, reducing alcohol consumption, monitoring blood pressure regularly.     Robbie Holley is not experiencing symptoms.    Follow-Up: BP is at goal of < 140/90mmHg (patient 18+ years of age with or without diabetes).  Recommended follow-up at pharmacy in 6 months.     Recommendation to Provider: none    Robbie Holley was evaluated for enrollment into the PGEN study today.    PLEASE INITIATE ENROLLMENT DISCUSSION WITH HTN PTS  1) Between 30-80 years old                                                                                                               2) BMI between 19-50                                                                                                        3) BP ?140/90 AND ?170/110 patients aged 30-59         BP ?150/90 AND ?170/110 non-diabetic patients aged 60-80       BP ?140/90 AND ?170/110 diabetic patients aged 60-80  4) Additional requirements for uncontrolled HTN patients:        Pt on only 1 class of medication  5) EXCLUDE patient if confirmation of:                  ? Cardiac disease                  ? Chronic Kidney Disease                  ? Pregnancy/Breastfeeding                  ? Secondary Hypertension/Pre-eclampsia                                 ? Vascular disease    Patient eligible for enrollment:  No  Patient interested in enrollment:  No    This note completed by: Harjinder Weaver, Pharmacist  [facility]

## 2019-08-05 ENCOUNTER — COMMUNICATION - HEALTHEAST (OUTPATIENT)
Dept: CARDIOLOGY | Facility: CLINIC | Age: 54
End: 2019-08-05

## 2019-08-05 DIAGNOSIS — I25.118 CORONARY ARTERY DISEASE OF NATIVE ARTERY OF NATIVE HEART WITH STABLE ANGINA PECTORIS (H): ICD-10-CM

## 2020-11-06 ENCOUNTER — NURSE TRIAGE (OUTPATIENT)
Dept: NURSING | Facility: CLINIC | Age: 55
End: 2020-11-06

## 2020-11-06 NOTE — TELEPHONE ENCOUNTER
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  Using a splint, but pain but symptoms continue on and off, and has soft spot near his umbillica that bulges when he coughs.    Prefers to go right to the \"hernia doctor\" rather than his PCP, but willing to make telephone visit with his PCP.  Transferred patient to scheduling.    Yazmin Santiago RN  [facility]      Reason for Disposition    [1] New-onset hernia suspected (reducible bulge in groin or abdomen; non-tender) AND [2] NO pain or vomiting    Additional Information    Negative: SEVERE abdominal pain    Negative: Hernia is painful or tender to touch    Negative: [1] Vomiting AND [2] can't reduce the hernia    Negative: [1] Vomiting AND [2] abdomen looks much more swollen than usual    Negative: [1] Swollen lump in groin AND [2] pulsating (like heartbeat)    Negative: Patient sounds very sick or weak to the triager    Negative: [1] Constant abdominal pain AND [2] present > 2 hours  (NO pain or tenderness of hernia)    Negative: Can't reduce the hernia (NO pain, local tenderness, or vomiting)    Protocols used: HERNIA-A-AH      "

## 2020-11-09 ENCOUNTER — VIRTUAL VISIT (OUTPATIENT)
Dept: FAMILY MEDICINE | Facility: CLINIC | Age: 55
End: 2020-11-09
Payer: COMMERCIAL

## 2020-11-09 DIAGNOSIS — K42.9 UMBILICAL HERNIA WITHOUT OBSTRUCTION AND WITHOUT GANGRENE: Primary | ICD-10-CM

## 2020-11-09 PROCEDURE — 99213 OFFICE O/P EST LOW 20 MIN: CPT | Mod: 95 | Performed by: PHYSICIAN ASSISTANT

## 2020-11-09 NOTE — PROGRESS NOTES
"Robbie Holley is a 54 year old male who is being evaluated via a billable telephone visit.      The patient has been notified of following:     \"This telephone visit will be conducted via a call between you and your physician/provider. We have found that certain health care needs can be provided without the need for a physical exam.  This service lets us provide the care you need with a short phone conversation.  If a prescription is necessary we can send it directly to your pharmacy.  If lab work is needed we can place an order for that and you can then stop by our lab to have the test done at a later time.    Telephone visits are billed at different rates depending on your insurance coverage. During this emergency period, for some insurers they may be billed the same as an in-person visit.  Please reach out to your insurance provider with any questions.    If during the course of the call the physician/provider feels a telephone visit is not appropriate, you will not be charged for this service.\"    Patient has given verbal consent for Telephone visit?  Yes    What phone number would you like to be contacted at? [phone]    How would you like to obtain your AVS? MyChart    Subjective     Robbie Holley is a 54 year old male who presents via phone visit today for the following health issues:    HPI     Concern - Possible Hernia   Onset: 2 months  Description: Pt was having sore muscles in abdomen on and off, noticed a bulge X 2 weeks.     At the top of the belly button, \"soft spot\"  Bulge with coughing x1 week  Tore some abdominal muscles ~2 months ago  Has had numerous hernias in the past, has mesh intact        Review of Systems   Constitutional, MSK, GI systems are negative, except as otherwise noted.       Objective          Vitals:  No vitals were obtained today due to virtual visit.    healthy, alert and no distress  PSYCH: Alert and oriented times 3; coherent speech, normal   rate and volume, able to " articulate logical thoughts, able   to abstract reason, no tangential thoughts, no hallucinations   or delusions  His affect is normal and pleasant  RESP: No cough, no audible wheezing, able to talk in full sentences  Remainder of exam unable to be completed due to telephone visits        Assessment/Plan:    Assessment & Plan     1. Umbilical hernia without obstruction and without gangrene        Referral to general surgery for further evaluation and likely repair. Discussed signs/symptoms that would warrant an ER visit.        Return for follow up with general surgery.    Patsy Abraham PA-C  [facility]    Phone call duration:  7 minutes

## 2020-12-15 ENCOUNTER — OFFICE VISIT (OUTPATIENT)
Dept: SURGERY | Facility: CLINIC | Age: 55
End: 2020-12-15
Attending: PHYSICIAN ASSISTANT
Payer: COMMERCIAL

## 2020-12-15 VITALS
TEMPERATURE: 97.7 F | HEIGHT: 71 IN | DIASTOLIC BLOOD PRESSURE: 104 MMHG | SYSTOLIC BLOOD PRESSURE: 147 MMHG | HEART RATE: 96 BPM | WEIGHT: 220 LBS | BODY MASS INDEX: 30.8 KG/M2

## 2020-12-15 DIAGNOSIS — E66.811 CLASS 1 OBESITY DUE TO EXCESS CALORIES WITHOUT SERIOUS COMORBIDITY WITH BODY MASS INDEX (BMI) OF 30.0 TO 30.9 IN ADULT: ICD-10-CM

## 2020-12-15 DIAGNOSIS — E66.09 CLASS 1 OBESITY DUE TO EXCESS CALORIES WITHOUT SERIOUS COMORBIDITY WITH BODY MASS INDEX (BMI) OF 30.0 TO 30.9 IN ADULT: ICD-10-CM

## 2020-12-15 DIAGNOSIS — M62.08 DIASTASIS RECTI: Primary | ICD-10-CM

## 2020-12-15 DIAGNOSIS — K42.9 UMBILICAL HERNIA WITHOUT OBSTRUCTION AND WITHOUT GANGRENE: ICD-10-CM

## 2020-12-15 DIAGNOSIS — F17.200 TOBACCO DEPENDENCE: ICD-10-CM

## 2020-12-15 PROCEDURE — 99244 OFF/OP CNSLTJ NEW/EST MOD 40: CPT | Performed by: SURGERY

## 2020-12-15 RX ORDER — ATORVASTATIN CALCIUM 40 MG/1
TABLET, FILM COATED ORAL
COMMUNITY
Start: 2020-10-22 | End: 2022-02-25

## 2020-12-15 RX ORDER — METOPROLOL SUCCINATE 50 MG/1
50 TABLET, EXTENDED RELEASE ORAL
COMMUNITY
Start: 2020-10-22 | End: 2022-09-22

## 2020-12-15 ASSESSMENT — MIFFLIN-ST. JEOR: SCORE: 1855.04

## 2020-12-15 NOTE — NURSING NOTE
"Initial BP (!) 147/104 (BP Location: Right arm, Patient Position: Sitting, Cuff Size: Adult Regular)   Pulse 96   Temp 97.7  F (36.5  C) (Tympanic)   Ht 1.803 m (5' 11\")   Wt 99.8 kg (220 lb)   BMI 30.68 kg/m   Estimated body mass index is 30.68 kg/m  as calculated from the following:    Height as of this encounter: 1.803 m (5' 11\").    Weight as of this encounter: 99.8 kg (220 lb). .    Shaneka Beltran MA    "

## 2020-12-15 NOTE — LETTER
"    12/15/2020         RE: Robbie Holley  [address] [address]        Dear Colleague,    Thank you for referring your patient, Robbie Holley, to the [facility]. Please see a copy of my visit note below.    Surgical Consultation/History and Physical  [facility]    Robbie is seen in consultation for umbilical hernia, at the request of Physician No Ref-Primary.    Chief Complaint:  Umbilical hernia    History of Present Illness: Robbie Holley is a 54 year old male presents with umbilical hernia.  Patient notes a 2 month history of \"torn muscles\" after lifting significant weight.  He admits a bulge at his belly button as well.  Denies constipation or difficulty urinating.  He admits chronic cough.  He does smoke.      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     Cervical pain     Tobacco use disorder     OA (osteoarthritis) of knee     Rash and nonspecific skin eruption     Narcotic withdrawal (H)     Depression, major, recurrent, moderate (H)     Colon polyps     Chronic pain syndrome     Psychophysiological insomnia     Erectile dysfunction, unspecified erectile dysfunction type     Past Medical History:   Diagnosis Date     Myocardial infarction (H)      Past Surgical History:   Procedure Laterality Date     ARTHROPLASTY HIP  12/19/2011    Procedure:ARTHROPLASTY HIP; Left Total Hip Arthroplasty; Surgeon:RANDY BANKS; Location:[facility] OR     ARTHROSCOPY KNEE WITH LATERAL MENISCECTOMY  3/1/2013    Procedure: ARTHROSCOPY KNEE WITH LATERAL MENISCECTOMY;  Right Knee Arthroscopy & Lateral Meniscectomy & Microfracture;  Surgeon: Randy Banks MD;  Location: [facility] OR     C STRESS ECHO (TREADMILL) FL  4/22/02 "     HERNIA REPAIR, INGUINAL RT/LT  06    Left inguinal hernia repair. Dr Garza     SURGICAL HISTORY OF -   3/10/03    Left knee meniscal tear repair     SURGICAL HISTORY OF -       elbow      SURGICAL HISTORY OF -       hernia     SURGICAL HISTORY OF -       cardiac stent     Family History   Problem Relation Age of Onset     C.A.D. Mother         passed from MI-first MI age 45     Diabetes Mother      Cerebrovascular Disease Mother      Hypertension Father      Psychotic Disorder Father         anxiety     Psychotic Disorder Brother         anxiety     Sleep Apnea Other         4 brothers     Breast Cancer No family hx of      Cancer - colorectal No family hx of      Prostate Cancer No family hx of      Social History     Tobacco Use     Smoking status: Current Every Day Smoker     Packs/day: 1.00     Years: 25.00     Pack years: 25.00     Types: Cigarettes     Last attempt to quit: 2015     Years since quittin.9     Smokeless tobacco: Never Used     Tobacco comment: 07   Substance Use Topics     Alcohol use: Yes     Alcohol/week: 0.0 standard drinks     Comment: twice monthly      History   Drug Use No     Current Outpatient Medications   Medication Sig Dispense Refill     aspirin 81 MG tablet Take 1 tablet by mouth daily. 100 tablet 3     clonazePAM (KLONOPIN) 1 MG tablet Take 0.5 in the morning and 1 tablet at night as needed.  OK to fill this once but he needs follow-up with office visit prior to additional refills. 45 tablet 0     metoprolol succinate ER (TOPROL-XL) 50 MG 24 hr tablet Take 50 mg by mouth       zolpidem (AMBIEN) 10 MG tablet Take 1 tablet (10 mg) by mouth nightly as needed for sleep 30 tablet 5     atorvastatin (LIPITOR) 40 MG tablet        IBUPROFEN PO Take 400 mg by mouth every 4 hours as needed for moderate pain       sildenafil (REVATIO/VIAGRA) 20 MG tablet Take 1 tab prior to intercourse no more than once daily. May increase at subsequent doses by 1 tab with each  "subsequent ineffective dose up to a maximum dosage of 5 tablets. 15 tablet 11     simvastatin (ZOCOR) 40 MG tablet Take 1 tablet (40 mg) by mouth At Bedtime (Patient not taking: Reported on 12/15/2020) 90 tablet 3     vardenafil (LEVITRA) 20 MG tablet Take 0.5-1 tablets (10-20 mg) by mouth daily as needed for erectile dysfunction Never use with nitroglycerin, terazosin or doxazosin. 12 tablet 11     Allergies   Allergen Reactions     Nka [No Known Allergies]      Review of Systems:   10 point ROS otherwise negative    Physical Exam:  BP (!) 147/104 (BP Location: Right arm, Patient Position: Sitting, Cuff Size: Adult Regular)   Pulse 96   Temp 97.7  F (36.5  C) (Tympanic)   Ht 1.803 m (5' 11\")   Wt 99.8 kg (220 lb)   BMI 30.68 kg/m      Constitutional- No acute distress, well nourished, non-toxic  Eyes: Anicteric, no injection.  PERRL  ENT:  Normocephalic, atraumatic, Nose midline, moist mucus membranes  Neck - supple, no LAD  Respiratory- Clear to auscultation bilaterally, good inspiratory effort  Cardiovascular - Heart RRR, no lift's, thrills, murmurs, rubs, or gallop.  No peripheral edema.  No clubbing.  Abdomen - Soft, non-tender, +BS, no hepatosplenomegaly, small 1.5 cm umbilical hernia, easily reducible.  Diastasis recti  Neuro - No focal neuro deficits, Alert and oriented x 3  Psych: Appropriate mood and affect  Musculoskeletal: Normal gait, symmetric strength.  FROM upper and lower extremities.  Skin: Warm, Dry    Assessment:  1. Diastasis recti    2. Umbilical hernia without obstruction and without gangrene    3. Tobacco dependence    4. Class 1 obesity due to excess calories without serious comorbidity with body mass index (BMI) of 30.0 to 30.9 in adult      Plan:     Robbie Holley presents with umbilical hernia, reducible in addition to diastasis recti. Symptoms are improving  recently. The patient may benefit from hernia repair at a later time.   We discussed that his smoking and obesity puts him at " a higher risk of hernia recurrence.  He relates that he previously had been quite fit and thin however gained significant weight over the last 2 years.  He also desires tobacco cessation with his family history of heart disease and personal history of MI.  He would like to pursue both goals at this time.  We discussed the nature of diastasis recti and umbilical hernias.  His hernia is quite small and he would benefit from behavior modification prior to proceeding with surgery.  He expressed understanding and wishes to observe his hernia at this time.  We reviewed the KRAMES hernia guide and extensively discussed the warning signs of incarceration/strangulation of hernia and discussed that the risk is quite small.      He will call when he desires hernia repair.  Barring significant change in his medical or surgical history, he can be scheduled with a pre-operative cardiac clearance exam from his PCP.  We discussed that mesh would likely be used in his case.      Valeriy De Santiago DO on 12/15/2020 at 11:18 AM        Again, thank you for allowing me to participate in the care of your patient.        Sincerely,        Valeriy De Santiago DO

## 2020-12-15 NOTE — PROGRESS NOTES
"Surgical Consultation/History and Physical  [facility]    Robbie is seen in consultation for umbilical hernia, at the request of Physician No Ref-Primary.    Chief Complaint:  Umbilical hernia    History of Present Illness: Robbie Holley is a 54 year old male presents with umbilical hernia.  Patient notes a 2 month history of \"torn muscles\" after lifting significant weight.  He admits a bulge at his belly button as well.  Denies constipation or difficulty urinating.  He admits chronic cough.  He does smoke.      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     Cervical pain     Tobacco use disorder     OA (osteoarthritis) of knee     Rash and nonspecific skin eruption     Narcotic withdrawal (H)     Depression, major, recurrent, moderate (H)     Colon polyps     Chronic pain syndrome     Psychophysiological insomnia     Erectile dysfunction, unspecified erectile dysfunction type     Past Medical History:   Diagnosis Date     Myocardial infarction (H)      Past Surgical History:   Procedure Laterality Date     ARTHROPLASTY HIP  12/19/2011    Procedure:ARTHROPLASTY HIP; Left Total Hip Arthroplasty; Surgeon:RANDY BANKS; Location:[place] [facility]     ARTHROSCOPY KNEE WITH LATERAL MENISCECTOMY  3/1/2013    Procedure: ARTHROSCOPY KNEE WITH LATERAL MENISCECTOMY;  Right Knee Arthroscopy & Lateral Meniscectomy & Microfracture;  Surgeon: Randy Banks MD;  Location: [place] OR     C STRESS ECHO (TREADMILL) FL  4/22/02     HERNIA REPAIR, INGUINAL RT/LT  11/20/06    Left inguinal hernia repair. Dr Garza     SURGICAL HISTORY OF -   3/10/03    Left knee meniscal tear repair     SURGICAL HISTORY OF -       elbow      SURGICAL HISTORY OF -       hernia     " SURGICAL HISTORY OF -       cardiac stent     Family History   Problem Relation Age of Onset     C.A.D. Mother         passed from MI-first MI age 45     Diabetes Mother      Cerebrovascular Disease Mother      Hypertension Father      Psychotic Disorder Father         anxiety     Psychotic Disorder Brother         anxiety     Sleep Apnea Other         4 brothers     Breast Cancer No family hx of      Cancer - colorectal No family hx of      Prostate Cancer No family hx of      Social History     Tobacco Use     Smoking status: Current Every Day Smoker     Packs/day: 1.00     Years: 25.00     Pack years: 25.00     Types: Cigarettes     Last attempt to quit: 2015     Years since quittin.9     Smokeless tobacco: Never Used     Tobacco comment: 07   Substance Use Topics     Alcohol use: Yes     Alcohol/week: 0.0 standard drinks     Comment: twice monthly      History   Drug Use No     Current Outpatient Medications   Medication Sig Dispense Refill     aspirin 81 MG tablet Take 1 tablet by mouth daily. 100 tablet 3     clonazePAM (KLONOPIN) 1 MG tablet Take 0.5 in the morning and 1 tablet at night as needed.  OK to fill this once but he needs follow-up with office visit prior to additional refills. 45 tablet 0     metoprolol succinate ER (TOPROL-XL) 50 MG 24 hr tablet Take 50 mg by mouth       zolpidem (AMBIEN) 10 MG tablet Take 1 tablet (10 mg) by mouth nightly as needed for sleep 30 tablet 5     atorvastatin (LIPITOR) 40 MG tablet        IBUPROFEN PO Take 400 mg by mouth every 4 hours as needed for moderate pain       sildenafil (REVATIO/VIAGRA) 20 MG tablet Take 1 tab prior to intercourse no more than once daily. May increase at subsequent doses by 1 tab with each subsequent ineffective dose up to a maximum dosage of 5 tablets. 15 tablet 11     simvastatin (ZOCOR) 40 MG tablet Take 1 tablet (40 mg) by mouth At Bedtime (Patient not taking: Reported on 12/15/2020) 90 tablet 3     vardenafil (LEVITRA)  "20 MG tablet Take 0.5-1 tablets (10-20 mg) by mouth daily as needed for erectile dysfunction Never use with nitroglycerin, terazosin or doxazosin. 12 tablet 11     Allergies   Allergen Reactions     Nka [No Known Allergies]      Review of Systems:   10 point ROS otherwise negative    Physical Exam:  BP (!) 147/104 (BP Location: Right arm, Patient Position: Sitting, Cuff Size: Adult Regular)   Pulse 96   Temp 97.7  F (36.5  C) (Tympanic)   Ht 1.803 m (5' 11\")   Wt 99.8 kg (220 lb)   BMI 30.68 kg/m      Constitutional- No acute distress, well nourished, non-toxic  Eyes: Anicteric, no injection.  PERRL  ENT:  Normocephalic, atraumatic, Nose midline, moist mucus membranes  Neck - supple, no LAD  Respiratory- Clear to auscultation bilaterally, good inspiratory effort  Cardiovascular - Heart RRR, no lift's, thrills, murmurs, rubs, or gallop.  No peripheral edema.  No clubbing.  Abdomen - Soft, non-tender, +BS, no hepatosplenomegaly, small 1.5 cm umbilical hernia, easily reducible.  Diastasis recti  Neuro - No focal neuro deficits, Alert and oriented x 3  Psych: Appropriate mood and affect  Musculoskeletal: Normal gait, symmetric strength.  FROM upper and lower extremities.  Skin: Warm, Dry    Assessment:  1. Diastasis recti    2. Umbilical hernia without obstruction and without gangrene    3. Tobacco dependence    4. Class 1 obesity due to excess calories without serious comorbidity with body mass index (BMI) of 30.0 to 30.9 in adult      Plan:     Robbie Holley presents with umbilical hernia, reducible in addition to diastasis recti. Symptoms are improving  recently. The patient may benefit from hernia repair at a later time.   We discussed that his smoking and obesity puts him at a higher risk of hernia recurrence.  He relates that he previously had been quite fit and thin however gained significant weight over the last 2 years.  He also desires tobacco cessation with his family history of heart disease and " personal history of MI.  He would like to pursue both goals at this time.  We discussed the nature of diastasis recti and umbilical hernias.  His hernia is quite small and he would benefit from behavior modification prior to proceeding with surgery.  He expressed understanding and wishes to observe his hernia at this time.  We reviewed the KRAMES hernia guide and extensively discussed the warning signs of incarceration/strangulation of hernia and discussed that the risk is quite small.      He will call when he desires hernia repair.  Barring significant change in his medical or surgical history, he can be scheduled with a pre-operative cardiac clearance exam from his PCP.  We discussed that mesh would likely be used in his case.      Valeriy De Santiago, DO on 12/15/2020 at 11:18 AM

## 2021-05-27 ENCOUNTER — RECORDS - HEALTHEAST (OUTPATIENT)
Dept: ADMINISTRATIVE | Facility: CLINIC | Age: 56
End: 2021-05-27

## 2021-05-31 VITALS — WEIGHT: 228.6 LBS | BODY MASS INDEX: 32 KG/M2 | HEIGHT: 71 IN

## 2022-02-10 ENCOUNTER — TRANSCRIBE ORDERS (OUTPATIENT)
Dept: OTHER | Age: 57
End: 2022-02-10
Payer: COMMERCIAL

## 2022-02-25 ENCOUNTER — OFFICE VISIT (OUTPATIENT)
Dept: CARDIOLOGY | Facility: CLINIC | Age: 57
End: 2022-02-25
Payer: COMMERCIAL

## 2022-02-25 VITALS
BODY MASS INDEX: 28.98 KG/M2 | DIASTOLIC BLOOD PRESSURE: 84 MMHG | SYSTOLIC BLOOD PRESSURE: 110 MMHG | WEIGHT: 207 LBS | HEIGHT: 71 IN | HEART RATE: 99 BPM | OXYGEN SATURATION: 95 %

## 2022-02-25 DIAGNOSIS — I25.10 CORONARY ARTERY DISEASE INVOLVING NATIVE HEART WITHOUT ANGINA PECTORIS, UNSPECIFIED VESSEL OR LESION TYPE: Primary | ICD-10-CM

## 2022-02-25 PROCEDURE — 99203 OFFICE O/P NEW LOW 30 MIN: CPT | Performed by: INTERNAL MEDICINE

## 2022-02-25 RX ORDER — NITROGLYCERIN 0.4 MG/1
0.4 TABLET SUBLINGUAL
COMMUNITY
Start: 2021-04-30 | End: 2022-11-09

## 2022-02-25 RX ORDER — PRASUGREL 10 MG/1
10 TABLET, FILM COATED ORAL
COMMUNITY
Start: 2022-02-06 | End: 2022-05-16

## 2022-02-25 RX ORDER — COLCHICINE 0.6 MG/1
0.6 TABLET ORAL
COMMUNITY
Start: 2022-02-06 | End: 2022-07-07

## 2022-02-25 RX ORDER — ATORVASTATIN CALCIUM 80 MG/1
40 TABLET, FILM COATED ORAL DAILY
COMMUNITY
Start: 2022-02-07

## 2022-02-25 RX ORDER — LOSARTAN POTASSIUM 25 MG/1
12.5 TABLET ORAL DAILY
Qty: 60 TABLET | Refills: 3 | Status: SHIPPED | OUTPATIENT
Start: 2022-02-25 | End: 2022-06-02

## 2022-02-25 NOTE — NURSING NOTE
"Initial /84   Pulse 99   Ht 1.791 m (5' 10.5\")   Wt 93.9 kg (207 lb)   SpO2 95%   BMI 29.28 kg/m   Estimated body mass index is 29.28 kg/m  as calculated from the following:    Height as of this encounter: 1.791 m (5' 10.5\").    Weight as of this encounter: 93.9 kg (207 lb). .      "

## 2022-02-25 NOTE — LETTER
2/25/2022    [facility]  [address] [address]    RE: Robbie FELDMAN Kishan       Dear Colleague,     I had the pleasure of seeing Robbie Holley in the [facility].  HPI and Plan:   Today I had the pleasure of seeing Robbie Holley at [facility]. He is a pleasant 56 year old patient with a past medical history of coronary artery disease who presents to the clinic to establish care.      The patient history of extensive coronary artery disease and underwent PCI of LAD in 2000 after a positive stress test.  He then underwent PCI of circumflex in 2013 and of proximal LAD in 2017.  He most recently sustained an anterior STEMI and underwent stenting of proximal LAD on 2/4/2022.  Angiogram also showed moderate disease elsewhere which did not require intervention.  Please refer to the angiogram report below for details.  An echocardiogram showed ejection fraction of 25-30% with wall motion abnormalities in the LAD territory.  I reviewed angiogram, echocardiogram, blood work from hospitalization.    Since discharge the patient has been doing well and has been trying to change his lifestyle drastically.  He is eating healthy, exercising regularly and trying to quit smoking.  He is on dual antiplatelet therapy as expected and is taking it religiously.  He is currently not on ACE/ARB, likely because of soft blood pressure.    Assessment and plan  1.  Coronary disease s/p multiple PCI's of LAD and most recently anterior STEMI on 2/4/2022  2.  Moderate-severe disease in mid and distal LAD and distal RCA.  3.  Ischemic cardiomyopathy with ejection fraction of 25-30%  4.  Hyperlipidemia  5.  Tobacco use    The patient has been asymptomatic since undergoing PCI of the proximal LAD.  He is changing his lifestyle which is exactly what he should be doing.  However, he is struggling with quitting smoking and I recommended that he talk to his primary care physician about  assistance with this.  As for his coronary artery disease is concerned we will manage it medically.  Fortunately, he is asymptomatic at this time.  For ischemic cardiomyopathy I suggested that we start him on low-dose of ARB because of soft blood pressure.  We will repeat a BMP in 2 weeks and we will also add AST ALT [elevated during hospitalization].  We will have him come back and see an ANN in 3 months with repeat transthoracic echocardiogram if the ejection fraction continues to remain low he will require ICD for primary prevention.    Thank you for allowing me to participate in the care of Robbie Holley    This note was completed in part using Dragon voice recognition software. Although reviewed after completion, some word and grammatical errors may occur.    Lars Álvarez MD  Cardiology    No orders of the defined types were placed in this encounter.      Orders Placed This Encounter   Medications     atorvastatin (LIPITOR) 80 MG tablet     Sig: Take 80 mg by mouth daily     nitroGLYcerin (NITROSTAT) 0.4 MG sublingual tablet     Sig: Place 0.4 mg under the tongue every 5 minutes     colchicine (COLCYRS) 0.6 MG tablet     Sig: Take 0.6 mg by mouth     prasugrel (EFFIENT) 10 MG TABS tablet     Sig: Take 10 mg by mouth       Medications Discontinued During This Encounter   Medication Reason     atorvastatin (LIPITOR) 40 MG tablet Cost/Formulary change       No diagnosis found.    CURRENT MEDICATIONS:  Current Outpatient Medications   Medication Sig Dispense Refill     aspirin 81 MG tablet Take 1 tablet by mouth daily. 100 tablet 3     atorvastatin (LIPITOR) 80 MG tablet Take 80 mg by mouth daily       clonazePAM (KLONOPIN) 1 MG tablet Take 0.5 in the morning and 1 tablet at night as needed.  OK to fill this once but he needs follow-up with office visit prior to additional refills. 45 tablet 0     colchicine (COLCYRS) 0.6 MG tablet Take 0.6 mg by mouth       metoprolol succinate ER (TOPROL-XL) 50 MG 24 hr tablet  Take 50 mg by mouth       prasugrel (EFFIENT) 10 MG TABS tablet Take 10 mg by mouth       zolpidem (AMBIEN) 10 MG tablet Take 1 tablet (10 mg) by mouth nightly as needed for sleep 30 tablet 5     IBUPROFEN PO Take 400 mg by mouth every 4 hours as needed for moderate pain       nitroGLYcerin (NITROSTAT) 0.4 MG sublingual tablet Place 0.4 mg under the tongue every 5 minutes (Patient not taking: Reported on 2/25/2022)       sildenafil (REVATIO/VIAGRA) 20 MG tablet Take 1 tab prior to intercourse no more than once daily. May increase at subsequent doses by 1 tab with each subsequent ineffective dose up to a maximum dosage of 5 tablets. 15 tablet 11     simvastatin (ZOCOR) 40 MG tablet Take 1 tablet (40 mg) by mouth At Bedtime (Patient not taking: Reported on 12/15/2020) 90 tablet 3     vardenafil (LEVITRA) 20 MG tablet Take 0.5-1 tablets (10-20 mg) by mouth daily as needed for erectile dysfunction Never use with nitroglycerin, terazosin or doxazosin. 12 tablet 11       ALLERGIES     Allergies   Allergen Reactions     Nka [No Known Allergies]        PAST MEDICAL HISTORY:  Past Medical History:   Diagnosis Date     Myocardial infarction (H)        PAST SURGICAL HISTORY:  Past Surgical History:   Procedure Laterality Date     ARTHROPLASTY HIP  12/19/2011    Procedure:ARTHROPLASTY HIP; Left Total Hip Arthroplasty; Surgeon:RANDY BANKS; Location:[place] [facility]     ARTHROSCOPY KNEE WITH LATERAL MENISCECTOMY  3/1/2013    Procedure: ARTHROSCOPY KNEE WITH LATERAL MENISCECTOMY;  Right Knee Arthroscopy & Lateral Meniscectomy & Microfracture;  Surgeon: Randy Banks MD;  Location: [place] OR     C UNLISTED PROCEDURE, ABDOMEN/PERITONEUM/OMENTUM      Description: Hernia Repair;  Recorded: 01/15/2013;     CARDIAC CATHETERIZATION       CORONARY STENT PLACEMENT       CV CORONARY ANGIOGRAM N/A 12/4/2017    Procedure: Coronary Angiogram;  Surgeon: Becca Goddard MD;  Location: [facility];  Service:      CV LEFT HEART  CATHETERIZATION WITH LEFT VENTRICULOGRAM N/A 2017    Procedure: Left Heart Catheterization with Left Ventriculogram;  Surgeon: Becca Goddard MD;  Location: [facility] Lab;  Service:      HERNIA REPAIR, INGUINAL RT/LT  06    Left inguinal hernia repair. Dr Garza     SURGICAL HISTORY OF -   3/10/03    Left knee meniscal tear repair     SURGICAL HISTORY OF -       elbow      SURGICAL HISTORY OF -       hernia     SURGICAL HISTORY OF -       cardiac stent     ZZC STRESS ECHO (TREADMILL) FL  02       FAMILY HISTORY:  Family History   Problem Relation Age of Onset     C.A.D. Mother         passed from MI-first MI age 45     Diabetes Mother      Cerebrovascular Disease Mother      Hypertension Father      Psychotic Disorder Father         anxiety     Psychotic Disorder Brother         anxiety     Sleep Apnea Other         4 brothers     Breast Cancer No family hx of      Cancer - colorectal No family hx of      Prostate Cancer No family hx of      Coronary Artery Disease Mother        SOCIAL HISTORY:  Social History     Socioeconomic History     Marital status:      Spouse name: None     Number of children: None     Years of education: None     Highest education level: None   Occupational History     None   Tobacco Use     Smoking status: Current Every Day Smoker     Packs/day: 1.00     Years: 25.00     Pack years: 25.00     Types: Cigarettes     Last attempt to quit: 2015     Years since quittin.1     Smokeless tobacco: Never Used     Tobacco comment: 07   Substance and Sexual Activity     Alcohol use: Yes     Alcohol/week: 0.0 standard drinks     Comment: twice monthly     Drug use: No     Sexual activity: Yes     Partners: Female   Other Topics Concern     Parent/sibling w/ CABG, MI or angioplasty before 65F 55M? Yes     Comment: mother MI age 45   Social History Narrative     None     Social Determinants of Health     Financial Resource Strain: Not on file   Food  Insecurity: Not on file   Transportation Needs: Not on file   Physical Activity: Not on file   Stress: Not on file   Social Connections: Not on file   Intimate Partner Violence: Not on file   Housing Stability: Not on file       Review of Systems:  Skin:  not assessed       Eyes:    glasses    ENT:  Negative      Respiratory:  Negative       Cardiovascular:  palpitations;chest pain;edema;dizziness;Negative;lightheadedness      Gastroenterology: not assessed      Genitourinary:  not assessed      Musculoskeletal:  Negative      Neurologic:  Negative      Psychiatric:         Heme/Lymph/Imm:  Negative      Endocrine:  Negative        Physical Exam:  Vitals: There were no vitals taken for this visit.  Eyes: No icterus.  Pulmonary: Chest symmetric, lungs clear bilaterally and no crackles, wheezes or rales.  Cardiovascular: RRR with normal S1 and S2, no murmur, JVP normal.  Musculoskeletal: Edema of the lower extremities: None.  Neurologic: Oriented and appropriate without obvious focal deficits.   Psychiatric: Normal affect.     Recent Lab Results:  LIPID RESULTS:  Lab Results   Component Value Date    CHOL 141 12/01/2017    CHOL 154 12/21/2016    HDL 25 (L) 12/01/2017    HDL 35 (L) 12/21/2016    LDL 89 12/01/2017    LDL 87 12/21/2016    TRIG 134 12/01/2017    TRIG 161 (H) 12/21/2016    CHOLHDLRATIO 4.0 01/21/2014       LIVER ENZYME RESULTS:  Lab Results   Component Value Date    AST 30 02/13/2008    ALT 50 08/19/2013       CBC RESULTS:  Lab Results   Component Value Date    WBC 8.6 10/12/2016    RBC 5.26 10/12/2016    HGB 16.8 10/12/2016    HCT 47.9 10/12/2016    MCV 91 10/12/2016    MCH 31.9 10/12/2016    MCHC 35.1 10/12/2016    RDW 12.6 10/12/2016     10/12/2016       BMP RESULTS:  Lab Results   Component Value Date     09/06/2016    POTASSIUM 4.5 09/06/2016    CHLORIDE 110 (H) 09/06/2016    CO2 21 09/06/2016    ANIONGAP 9 09/06/2016    GLC 77 10/12/2016    BUN 11 09/06/2016    CR 0.78 09/06/2016     GFRESTIMATED >90  Non  GFR Calc   09/06/2016    GFRESTBLACK >90   GFR Calc   09/06/2016    NATALYA 8.9 09/06/2016        A1C RESULTS:  Lab Results   Component Value Date    A1C 5.4 08/19/2013       INR RESULTS:  Lab Results   Component Value Date    INR 1.6 (A) 12/30/2011    INR 1.3 (A) 12/27/2011    INR 1.31 (H) 12/20/2011    INR 1.11 12/19/2011       CC  No referring provider defined for this encounter.    All medical records were reviewed in detail and discussed with the patient. Greater than 30 mins were spent with the patient, 50% of this time was spent on counseling and coordination of care.  After visit summary was printed and given to the patient.      Thank you for allowing me to participate in the care of your patient.      Sincerely,     Lars Álvarez MD     [facility]  cc:   No referring provider defined for this encounter.

## 2022-02-25 NOTE — PROGRESS NOTES
HPI and Plan:   Today I had the pleasure of seeing Robbie Holley at [facility]. He is a pleasant 56 year old patient with a past medical history of coronary artery disease who presents to the clinic to establish care.      The patient history of extensive coronary artery disease and underwent PCI of LAD in 2000 after a positive stress test.  He then underwent PCI of circumflex in 2013 and of proximal LAD in 2017.  He most recently sustained an anterior STEMI and underwent stenting of proximal LAD on 2/4/2022.  Angiogram also showed moderate disease elsewhere which did not require intervention.  Please refer to the angiogram report below for details.  An echocardiogram showed ejection fraction of 25-30% with wall motion abnormalities in the LAD territory.  I reviewed angiogram, echocardiogram, blood work from hospitalization.    Since discharge the patient has been doing well and has been trying to change his lifestyle drastically.  He is eating healthy, exercising regularly and trying to quit smoking.  He is on dual antiplatelet therapy as expected and is taking it religiously.  He is currently not on ACE/ARB, likely because of soft blood pressure.    Assessment and plan  1.  Coronary disease s/p multiple PCI's of LAD and most recently anterior STEMI on 2/4/2022  2.  Moderate-severe disease in mid and distal LAD and distal RCA.  3.  Ischemic cardiomyopathy with ejection fraction of 25-30%  4.  Hyperlipidemia  5.  Tobacco use    The patient has been asymptomatic since undergoing PCI of the proximal LAD.  He is changing his lifestyle which is exactly what he should be doing.  However, he is struggling with quitting smoking and I recommended that he talk to his primary care physician about assistance with this.  As for his coronary artery disease is concerned we will manage it medically.  Fortunately, he is asymptomatic at this time.  For ischemic cardiomyopathy I suggested that we start him on  low-dose of ARB because of soft blood pressure.  We will repeat a BMP in 2 weeks and we will also add AST ALT [elevated during hospitalization].  We will have him come back and see an ANN in 3 months with repeat transthoracic echocardiogram if the ejection fraction continues to remain low he will require ICD for primary prevention.    Thank you for allowing me to participate in the care of Robbie Holley    This note was completed in part using Dragon voice recognition software. Although reviewed after completion, some word and grammatical errors may occur.    Lars Álvarez MD  Cardiology    No orders of the defined types were placed in this encounter.      Orders Placed This Encounter   Medications     atorvastatin (LIPITOR) 80 MG tablet     Sig: Take 80 mg by mouth daily     nitroGLYcerin (NITROSTAT) 0.4 MG sublingual tablet     Sig: Place 0.4 mg under the tongue every 5 minutes     colchicine (COLCYRS) 0.6 MG tablet     Sig: Take 0.6 mg by mouth     prasugrel (EFFIENT) 10 MG TABS tablet     Sig: Take 10 mg by mouth       Medications Discontinued During This Encounter   Medication Reason     atorvastatin (LIPITOR) 40 MG tablet Cost/Formulary change       No diagnosis found.    CURRENT MEDICATIONS:  Current Outpatient Medications   Medication Sig Dispense Refill     aspirin 81 MG tablet Take 1 tablet by mouth daily. 100 tablet 3     atorvastatin (LIPITOR) 80 MG tablet Take 80 mg by mouth daily       clonazePAM (KLONOPIN) 1 MG tablet Take 0.5 in the morning and 1 tablet at night as needed.  OK to fill this once but he needs follow-up with office visit prior to additional refills. 45 tablet 0     colchicine (COLCYRS) 0.6 MG tablet Take 0.6 mg by mouth       metoprolol succinate ER (TOPROL-XL) 50 MG 24 hr tablet Take 50 mg by mouth       prasugrel (EFFIENT) 10 MG TABS tablet Take 10 mg by mouth       zolpidem (AMBIEN) 10 MG tablet Take 1 tablet (10 mg) by mouth nightly as needed for sleep 30 tablet 5     IBUPROFEN PO  Take 400 mg by mouth every 4 hours as needed for moderate pain       nitroGLYcerin (NITROSTAT) 0.4 MG sublingual tablet Place 0.4 mg under the tongue every 5 minutes (Patient not taking: Reported on 2/25/2022)       sildenafil (REVATIO/VIAGRA) 20 MG tablet Take 1 tab prior to intercourse no more than once daily. May increase at subsequent doses by 1 tab with each subsequent ineffective dose up to a maximum dosage of 5 tablets. 15 tablet 11     simvastatin (ZOCOR) 40 MG tablet Take 1 tablet (40 mg) by mouth At Bedtime (Patient not taking: Reported on 12/15/2020) 90 tablet 3     vardenafil (LEVITRA) 20 MG tablet Take 0.5-1 tablets (10-20 mg) by mouth daily as needed for erectile dysfunction Never use with nitroglycerin, terazosin or doxazosin. 12 tablet 11       ALLERGIES     Allergies   Allergen Reactions     Nka [No Known Allergies]        PAST MEDICAL HISTORY:  Past Medical History:   Diagnosis Date     Myocardial infarction (H)        PAST SURGICAL HISTORY:  Past Surgical History:   Procedure Laterality Date     ARTHROPLASTY HIP  12/19/2011    Procedure:ARTHROPLASTY HIP; Left Total Hip Arthroplasty; Surgeon:RANDY BANKS; Location:[facility] OR     ARTHROSCOPY KNEE WITH LATERAL MENISCECTOMY  3/1/2013    Procedure: ARTHROSCOPY KNEE WITH LATERAL MENISCECTOMY;  Right Knee Arthroscopy & Lateral Meniscectomy & Microfracture;  Surgeon: Randy Banks MD;  Location: [facility] OR     C UNLISTED PROCEDURE, ABDOMEN/PERITONEUM/OMENTUM      Description: Hernia Repair;  Recorded: 01/15/2013;     CARDIAC CATHETERIZATION       CORONARY STENT PLACEMENT       CV CORONARY ANGIOGRAM N/A 12/4/2017    Procedure: Coronary Angiogram;  Surgeon: Becca Goddard MD;  Location: [facility];  Service:      CV LEFT HEART CATHETERIZATION WITH LEFT VENTRICULOGRAM N/A 12/4/2017    Procedure: Left Heart Catheterization with Left Ventriculogram;  Surgeon: Becca Goddard MD;  Location: [facility];  Service:      HERNIA REPAIR,  INGUINAL RT/LT  06    Left inguinal hernia repair. Dr Garza     SURGICAL HISTORY OF -   3/10/03    Left knee meniscal tear repair     SURGICAL HISTORY OF -       elbow      SURGICAL HISTORY OF -       hernia     SURGICAL HISTORY OF -       cardiac stent     ZZC STRESS ECHO (TREADMILL) [place]  02       FAMILY HISTORY:  Family History   Problem Relation Age of Onset     C.A.D. Mother         passed from MI-first MI age 45     Diabetes Mother      Cerebrovascular Disease Mother      Hypertension Father      Psychotic Disorder Father         anxiety     Psychotic Disorder Brother         anxiety     Sleep Apnea Other         4 brothers     Breast Cancer No family hx of      Cancer - colorectal No family hx of      Prostate Cancer No family hx of      Coronary Artery Disease Mother        SOCIAL HISTORY:  Social History     Socioeconomic History     Marital status:      Spouse name: None     Number of children: None     Years of education: None     Highest education level: None   Occupational History     None   Tobacco Use     Smoking status: Current Every Day Smoker     Packs/day: 1.00     Years: 25.00     Pack years: 25.00     Types: Cigarettes     Last attempt to quit: 2015     Years since quittin.1     Smokeless tobacco: Never Used     Tobacco comment: 07   Substance and Sexual Activity     Alcohol use: Yes     Alcohol/week: 0.0 standard drinks     Comment: twice monthly     Drug use: No     Sexual activity: Yes     Partners: Female   Other Topics Concern     Parent/sibling w/ CABG, MI or angioplasty before 65F 55M? Yes     Comment: mother MI age 45   Social History Narrative     None     Social Determinants of Health     Financial Resource Strain: Not on file   Food Insecurity: Not on file   Transportation Needs: Not on file   Physical Activity: Not on file   Stress: Not on file   Social Connections: Not on file   Intimate Partner Violence: Not on file   Housing Stability: Not on  file       Review of Systems:  Skin:  not assessed       Eyes:    glasses    ENT:  Negative      Respiratory:  Negative       Cardiovascular:  palpitations;chest pain;edema;dizziness;Negative;lightheadedness      Gastroenterology: not assessed      Genitourinary:  not assessed      Musculoskeletal:  Negative      Neurologic:  Negative      Psychiatric:         Heme/Lymph/Imm:  Negative      Endocrine:  Negative        Physical Exam:  Vitals: There were no vitals taken for this visit.  Eyes: No icterus.  Pulmonary: Chest symmetric, lungs clear bilaterally and no crackles, wheezes or rales.  Cardiovascular: RRR with normal S1 and S2, no murmur, JVP normal.  Musculoskeletal: Edema of the lower extremities: None.  Neurologic: Oriented and appropriate without obvious focal deficits.   Psychiatric: Normal affect.     Recent Lab Results:  LIPID RESULTS:  Lab Results   Component Value Date    CHOL 141 12/01/2017    CHOL 154 12/21/2016    HDL 25 (L) 12/01/2017    HDL 35 (L) 12/21/2016    LDL 89 12/01/2017    LDL 87 12/21/2016    TRIG 134 12/01/2017    TRIG 161 (H) 12/21/2016    CHOLHDLRATIO 4.0 01/21/2014       LIVER ENZYME RESULTS:  Lab Results   Component Value Date    AST 30 02/13/2008    ALT 50 08/19/2013       CBC RESULTS:  Lab Results   Component Value Date    WBC 8.6 10/12/2016    RBC 5.26 10/12/2016    HGB 16.8 10/12/2016    HCT 47.9 10/12/2016    MCV 91 10/12/2016    MCH 31.9 10/12/2016    MCHC 35.1 10/12/2016    RDW 12.6 10/12/2016     10/12/2016       BMP RESULTS:  Lab Results   Component Value Date     09/06/2016    POTASSIUM 4.5 09/06/2016    CHLORIDE 110 (H) 09/06/2016    CO2 21 09/06/2016    ANIONGAP 9 09/06/2016    GLC 77 10/12/2016    BUN 11 09/06/2016    CR 0.78 09/06/2016    GFRESTIMATED >90  Non  GFR Calc   09/06/2016    GFRESTBLACK >90   GFR Calc   09/06/2016    NATALYA 8.9 09/06/2016        A1C RESULTS:  Lab Results   Component Value Date    A1C 5.4 08/19/2013        INR RESULTS:  Lab Results   Component Value Date    INR 1.6 (A) 12/30/2011    INR 1.3 (A) 12/27/2011    INR 1.31 (H) 12/20/2011    INR 1.11 12/19/2011       CC  No referring provider defined for this encounter.    All medical records were reviewed in detail and discussed with the patient. Greater than 30 mins were spent with the patient, 50% of this time was spent on counseling and coordination of care.  After visit summary was printed and given to the patient.

## 2022-03-04 ENCOUNTER — LAB (OUTPATIENT)
Dept: LAB | Facility: CLINIC | Age: 57
End: 2022-03-04
Payer: COMMERCIAL

## 2022-03-04 DIAGNOSIS — I25.10 CORONARY ARTERY DISEASE INVOLVING NATIVE HEART WITHOUT ANGINA PECTORIS, UNSPECIFIED VESSEL OR LESION TYPE: ICD-10-CM

## 2022-03-04 LAB
ALT SERPL W P-5'-P-CCNC: 32 U/L (ref 0–70)
ANION GAP SERPL CALCULATED.3IONS-SCNC: 3 MMOL/L (ref 3–14)
AST SERPL W P-5'-P-CCNC: 17 U/L (ref 0–45)
BUN SERPL-MCNC: 12 MG/DL (ref 7–30)
CALCIUM SERPL-MCNC: 9.3 MG/DL (ref 8.5–10.1)
CHLORIDE BLD-SCNC: 108 MMOL/L (ref 94–109)
CO2 SERPL-SCNC: 29 MMOL/L (ref 20–32)
CREAT SERPL-MCNC: 0.99 MG/DL (ref 0.66–1.25)
GFR SERPL CREATININE-BSD FRML MDRD: 89 ML/MIN/1.73M2
GLUCOSE BLD-MCNC: 101 MG/DL (ref 70–99)
POTASSIUM BLD-SCNC: 4.5 MMOL/L (ref 3.4–5.3)
SODIUM SERPL-SCNC: 140 MMOL/L (ref 133–144)

## 2022-03-04 PROCEDURE — 36415 COLL VENOUS BLD VENIPUNCTURE: CPT

## 2022-03-04 PROCEDURE — 84460 ALANINE AMINO (ALT) (SGPT): CPT

## 2022-03-04 PROCEDURE — 80048 BASIC METABOLIC PNL TOTAL CA: CPT

## 2022-03-04 PROCEDURE — 84450 TRANSFERASE (AST) (SGOT): CPT

## 2022-05-16 ENCOUNTER — HOSPITAL ENCOUNTER (OUTPATIENT)
Dept: CARDIOLOGY | Facility: CLINIC | Age: 57
Discharge: HOME | End: 2022-05-16
Attending: INTERNAL MEDICINE | Admitting: INTERNAL MEDICINE
Payer: COMMERCIAL

## 2022-05-16 DIAGNOSIS — I25.10 CORONARY ARTERY DISEASE INVOLVING NATIVE HEART WITHOUT ANGINA PECTORIS, UNSPECIFIED VESSEL OR LESION TYPE: ICD-10-CM

## 2022-05-16 DIAGNOSIS — I25.10 CORONARY ARTERY DISEASE INVOLVING NATIVE HEART WITHOUT ANGINA PECTORIS, UNSPECIFIED VESSEL OR LESION TYPE: Primary | ICD-10-CM

## 2022-05-16 LAB — LVEF ECHO: NORMAL

## 2022-05-16 PROCEDURE — 999N000208 ECHOCARDIOGRAM COMPLETE

## 2022-05-16 PROCEDURE — 255N000002 HC RX 255 OP 636: Performed by: INTERNAL MEDICINE

## 2022-05-16 PROCEDURE — 93306 TTE W/DOPPLER COMPLETE: CPT | Mod: 26 | Performed by: INTERNAL MEDICINE

## 2022-05-16 RX ORDER — PRASUGREL 10 MG/1
10 TABLET, FILM COATED ORAL ONCE
Qty: 90 TABLET | Refills: 3 | Status: SHIPPED | OUTPATIENT
Start: 2022-05-16 | End: 2022-07-07

## 2022-05-16 RX ADMIN — HUMAN ALBUMIN MICROSPHERES AND PERFLUTREN 2 ML: 10; .22 INJECTION, SOLUTION INTRAVENOUS at 11:23

## 2022-05-16 NOTE — TELEPHONE ENCOUNTER
[facility] Call Center    Phone Message    May a detailed message be left on voicemail: no     Reason for Call: Medication Question or concern regarding medication   Prescription Clarification  Name of Medication: prasugrel (EFFIENT) 10 MG TABS tablet  Prescribing Provider: Robbie Holley    Pharmacy: [facility] [place] - [id number] [facility]   What on the order needs clarification?   Refill request           Action Taken: Other: [facility]    Travel Screening: Not Applicable

## 2022-05-25 PROBLEM — I25.5 ISCHEMIC CARDIOMYOPATHY: Status: ACTIVE | Noted: 2022-05-25

## 2022-05-31 ENCOUNTER — TELEPHONE (OUTPATIENT)
Dept: CARDIOLOGY | Facility: CLINIC | Age: 57
End: 2022-05-31
Payer: COMMERCIAL

## 2022-05-31 DIAGNOSIS — I25.10 CORONARY ARTERY DISEASE INVOLVING NATIVE HEART WITHOUT ANGINA PECTORIS, UNSPECIFIED VESSEL OR LESION TYPE: ICD-10-CM

## 2022-05-31 NOTE — TELEPHONE ENCOUNTER
[facility] Call Center    Phone Message    May a detailed message be left on voicemail: no     Reason for Call: Other: Robbie called to request results of the Echo done on 5/16/2022 and to discuss medication management, please reach out to Robbie at [phone].     Action Taken: Other: [facility]    Travel Screening: Not Applicable     ADDENDUM: Disc results with patient. He was asking if he should increase Losartan to a full tab as he is only taking 1/2 tab now. Disc cardiomyopathy care and medication up-titration. Pt did not know he had an appointment last week so didn't show up. He does not know what his BP is and doesn't monitor at home. Disc purchasing a BP cuff and starting to monitor it at home. He will do this and then call me with his readings. Appt made for 6/10/22 to follow up with Michaelle Barker NP. If BP good at time of phone call, will discuss with Dr Álvarez if pt should increase losartan prior to visit with Michaelle. Sylvia Lee RN Cardiology May 31, 2022, 3:04 PM           ADDENDUM:  Robbie called to report his BP/P, as directed.  /81, P 75. Will discuss with Dr Hopkins (in Dr Álvarez's absence) if patient can increase Losartan to 25 mg every day as noted in Dr Álvarez's note on echo results from 5/16/22. Sylvia Lee RN Cardiology June 1, 2022, 1:24 PM

## 2022-06-01 NOTE — TELEPHONE ENCOUNTER
Yes his BP is appropriate to up-titrate losartan. Thanks.     ADDENDUM: Left detailed message for pt re: recommendation to increase losartan to 25 mg daily. Also instructed him to monitor his BP at least 60 minutes after medication and call if systolic BP is <90 or if it is <100 and he is lightheaded or dizzy. Script for increased dose sent to pharmacy. Sylvia Lee RN Cardiology June 2, 2022, 8:07 AM     Helical Rim Text: The closure involved the helical rim.

## 2022-06-02 RX ORDER — LOSARTAN POTASSIUM 25 MG/1
25 TABLET ORAL DAILY
Qty: 30 TABLET | Refills: 3 | Status: SHIPPED | OUTPATIENT
Start: 2022-06-02 | End: 2022-08-11

## 2022-06-02 NOTE — TELEPHONE ENCOUNTER
Robbie mccrary call to say he did receive instructions for medication adjustment.  Wondering if he should be under any physical restrictions.  Okay to leave him a message in reply.      ADDENDUM: LM for patient that he has no real restrictions other than his symptoms--if his activity causes chest symptoms, SOB, lightheadedness or extreme fatigue, stop and rest and resume activity when able at a lower level. Asked him to call back with any further questions or concerns. Also asked him to call if he gets any of those symptoms. Sylvia Lee RN Cardiology June 2, 2022, 2:23 PM

## 2022-06-09 ENCOUNTER — TELEPHONE (OUTPATIENT)
Dept: CARDIOLOGY | Facility: CLINIC | Age: 57
End: 2022-06-09
Payer: COMMERCIAL

## 2022-06-09 NOTE — TELEPHONE ENCOUNTER
Patient returned call to nurse wanting to discuss if there is anything he needs to do differently while waiting to see Michaelle Barker on 7/9/22.  Patient's reported BP's have been 120's systolic.  HR 70s.  Patient advised to continue as is until his appt.  Patient has verbalized understanding.  Delilah Luo RN on 6/9/2022 at 3:13 PM

## 2022-06-09 NOTE — TELEPHONE ENCOUNTER
[facility] Call Center    Phone Message    May a detailed message be left on voicemail: yes     Reason for Call: Other: Call Back    The patient had an appt darrion/Michaelle tomorrow and it was cx'd due to provider being ill  Patient wants a call back from a nurse to discuss a few things since his appt has been delayed     Action Taken: Other: Cardiology    Travel Screening: Not Applicable

## 2022-06-21 ENCOUNTER — TELEPHONE (OUTPATIENT)
Dept: CARDIOLOGY | Facility: CLINIC | Age: 57
End: 2022-06-21
Payer: COMMERCIAL

## 2022-06-21 NOTE — TELEPHONE ENCOUNTER
Patient returned call to RN.  Patient states HR has been right around 90.  Does report SBP around 125.  Some lightheadedness noted, but states this has been an ongoing problem for years.  Patient denies CP or SOB.  Patient to discuss symptoms further at upcoming OV 7/7/22.  Patient advised that should he have any CP or SOB that he return call to clinic or report to ED.  Patient verbalized understanding.  Delilah Luo RN on 6/21/2022 at 2:24 PM

## 2022-06-21 NOTE — TELEPHONE ENCOUNTER
"Robbie relates that he was instructed to call if his HR is over 90.  He reports that his pulse for the past several days has been running consistently around 90-91.  Maximum HR has been 92, minimum HR has been 78.  States his BP has been \"perfect\", though he was unable to provide any readings for me, as he has them at home and he's calling from work.    He has been noticing some brief episodes of light-headedness.  Asked if he notices anything that triggers them (exertion, position, etc), he says he hasn't noticed anything.  That being said, he said he does feel better if he takes a minute to eat or drink something, and notes that he also has some anxiety, which he suspects contributes to the light-headedness.      He's requesting a call to discuss.  "

## 2022-06-21 NOTE — TELEPHONE ENCOUNTER
Returned call to patient with no answer, message left for patient to return call to clinic at his convenience.  Delilah Luo RN on 6/21/2022 at 1:37 PM

## 2022-07-07 ENCOUNTER — OFFICE VISIT (OUTPATIENT)
Dept: CARDIOLOGY | Facility: CLINIC | Age: 57
End: 2022-07-07
Payer: COMMERCIAL

## 2022-07-07 VITALS
SYSTOLIC BLOOD PRESSURE: 121 MMHG | OXYGEN SATURATION: 97 % | WEIGHT: 211 LBS | TEMPERATURE: 97.5 F | DIASTOLIC BLOOD PRESSURE: 86 MMHG | HEART RATE: 81 BPM | BODY MASS INDEX: 29.85 KG/M2

## 2022-07-07 DIAGNOSIS — E78.5 HYPERLIPIDEMIA LDL GOAL <70: ICD-10-CM

## 2022-07-07 DIAGNOSIS — I25.5 ISCHEMIC CARDIOMYOPATHY: ICD-10-CM

## 2022-07-07 DIAGNOSIS — I25.10 CORONARY ARTERY DISEASE INVOLVING NATIVE CORONARY ARTERY OF NATIVE HEART WITHOUT ANGINA PECTORIS: Primary | ICD-10-CM

## 2022-07-07 DIAGNOSIS — I10 BENIGN ESSENTIAL HYPERTENSION: ICD-10-CM

## 2022-07-07 PROCEDURE — 99214 OFFICE O/P EST MOD 30 MIN: CPT | Performed by: NURSE PRACTITIONER

## 2022-07-07 RX ORDER — SPIRONOLACTONE 25 MG/1
12.5 TABLET ORAL DAILY
Qty: 15 TABLET | Refills: 11 | Status: SHIPPED | OUTPATIENT
Start: 2022-07-07 | End: 2022-08-04

## 2022-07-07 NOTE — LETTER
7/7/2022    [facility]  [address] [address]    RE: Robbie Holley       Dear Colleague,     I had the pleasure of seeing Robbie Holley in the [facility].  Cardiology Clinic Progress Note  Robbie Holley MRN# [id number]   YOB: 1965 Age: 56 year old      Primary Cardiologist:   Dr. Álvarez          History of Presenting Illness:      Robbie Holley is a pleasant 56 year old patient with a past cardiac history significant for   1. CAD  2. Ischemic cardiomyopathy  3. Hypertension  4. Hyperlipidemia  Past medical history significant for prior tobacco use.    He has a history of CAD with PCI to the LAD in 2000 after a positive stress test.  He had PCI of the circumflex in 2013 and PCI of proximal LAD in 2017.    In February 2022 he had anterior STEMI s/p stent of the proximal LAD with residual moderate disease elsewhere.  Echocardiogram at that time showed EF 25 to 30% with wall motion abnormalities in the LAD.    Patient was seen by Dr. Álvarez for hospital follow-up.  He was trying to change his lifestyle drastically and was eating healthy and exercising regularly.  He was trying to quit smoking.  He was started on losartan.    Pt presents today for 3-month follow-up.  ALT and AST normalized.  BMP 3/4/2022, after starting losartan, shows normal renal function and electrolytes. Echocardiogram 5/16/2022 showing EF slightly improved to 30 to 35%, continued with wall motion abnormalities, RV normal size and function, no significant valvular disease, and ascending aorta 3.9 cm.  Results reviewed today.  This was reviewed by Dr. Álvarez who recommended increasing losartan and adding spironolactone.  Would hold off on referring to EP for ICD at this point.  Results reviewed today.    He has increased losartan to 25 mg daily and denies any side effects with this.  Blood pressure today is well controlled.  Weight today in the clinic is up 4 pounds over the last 5  months.  He has not been checking daily weights at home.  I stressed the importance of daily weights and have given him written information about low-sodium diet today.  He denies any heart failure symptoms.  He was previously exercising walking 2 miles per day but more recently has not been.  He will work on restarting this.  I congratulated him on smoking cessation.  However, he has been vaping but is trying to cut back on the nicotine and ultimately trying to quit.  He denies any anginal symptoms with exercise.  He is agreeable to adding spironolactone for cardiomyopathy.  We had a discussion regarding interactions with nitroglycerin and sildenafil and he is aware that he should not take these within 24 hours of each other. Patient reports no chest pain, shortness of breath, PND, orthopnea, presyncope, syncope, edema, heart racing, or palpitations.    Labs:  LIPID RESULTS:  Lab Results   Component Value Date    CHOL 141 12/01/2017    CHOL 154 12/21/2016    HDL 25 (L) 12/01/2017    HDL 35 (L) 12/21/2016    LDL 89 12/01/2017    LDL 87 12/21/2016    TRIG 134 12/01/2017    TRIG 161 (H) 12/21/2016    CHOLHDLRATIO 4.0 01/21/2014       LIVER ENZYME RESULTS:  Lab Results   Component Value Date    AST 17 03/04/2022    AST 30 02/13/2008    ALT 32 03/04/2022    ALT 50 08/19/2013       CBC RESULTS:  Lab Results   Component Value Date    WBC 8.6 10/12/2016    RBC 5.26 10/12/2016    HGB 16.8 10/12/2016    HCT 47.9 10/12/2016    MCV 91 10/12/2016    MCH 31.9 10/12/2016    MCHC 35.1 10/12/2016    RDW 12.6 10/12/2016     10/12/2016       BMP RESULTS:  Lab Results   Component Value Date     03/04/2022     09/06/2016    POTASSIUM 4.5 03/04/2022    POTASSIUM 4.5 09/06/2016    CHLORIDE 108 03/04/2022    CHLORIDE 110 (H) 09/06/2016    CO2 29 03/04/2022    CO2 21 09/06/2016    ANIONGAP 3 03/04/2022    ANIONGAP 9 09/06/2016     (H) 03/04/2022    GLC 77 10/12/2016    BUN 12 03/04/2022    BUN 11 09/06/2016    CR  0.99 03/04/2022    CR 0.78 09/06/2016    GFRESTIMATED 89 03/04/2022    GFRESTIMATED >90  Non  GFR Calc   09/06/2016    GFRESTBLACK >90   GFR Calc   09/06/2016    NATALYA 9.3 03/04/2022    NATALYA 8.9 09/06/2016        A1C RESULTS:  Lab Results   Component Value Date    A1C 5.4 08/19/2013       INR RESULTS:  Lab Results   Component Value Date    INR 1.6 (A) 12/30/2011    INR 1.3 (A) 12/27/2011    INR 1.31 (H) 12/20/2011    INR 1.11 12/19/2011       Results reviewed today.       Current Cardiac Medications     Aspirin 81 mg daily  Atorvastatin 40 mg daily   Losartan 25 mg daily  Metoprolol XL 50 mg daily  Nitroglycerin as needed  Effient 10 mg daily  Sildenafil as needed                   Assessment and Plan:       Plan    Patient Instructions   Medication Changes:  5. START spironolactone 12.5 mg daily for CM    Recommendations:  1. Call if blood pressure is less than 90 on top or less than 100 with lightheadedness.    2. Check daily weights and call the clinic if your weight has increased more than 2 lbs in one day or 5 lbs in one week; if you feel more short of breath or have worsening swelling in your legs or abdomen.  3. 2000 mg sodium diet   4. 4-8 8 ounce glasses of fluids per day  5. Discuss risk of stroke with primary care     Follow-up:  1. Fasting lab (lipid/ALT) -after previously starting statin  2.  Nonfasting lab in 1-2 weeks (BMP)-after starting spironolactone and previously increasing losartan  3. See Michaelle TIAN for cardiology follow up at [facility]: 2-4 weeks to uptitrate cardiomyopathy medications    Cardiology Scheduling~[phone]  Cardiology Clinic RN~[phone] (Sylvia Lee RN)        1. CAD    2000 PCI to LAD    2013 PCI to LCx    2017 PCI to proximal LAD    2/2022 anterior STEMI stent to proximal LAD    No angina    Continue statin, aspirin, ARB, beta-blocker    Continue DAPT, uninterrupted, for at least 1 year (2/2023)-possibly longer given STEMI and stent  to proximal LAD      2. Ischemic cardiomyopathy    EF 25 to 30% at time of STEMI 2/2022, slightly improved 30 to 35% 5/2022    No signs of heart failure    Continue ARB, beta-blocker, start spironolactone    EP consult, to discuss ICD, if EF remains less than 35%      3. Hyperlipidemia    No lipids on file    Continue atorvastatin 40 mg daily    Reassess lipids on statin      4. Hypertension    Controlled    Continue lisinopril, metoprolol, spironolactone               Thank you for allowing me to participate in this delightful patient's care.      This note was completed in part using Dragon voice recognition software. Although reviewed after completion, some word and grammatical errors may occur.    JEANIE Medrano CNP, JEANIE, CNP           Data:   All laboratory data reviewed      Total time spent today was 35 mins, reviewing labs, testing, notes, documenting notes, and seeing patient.          Constitutional:  cooperative, alert and oriented, well developed, well nourished, in no acute distress  overweight    Skin:  warm and dry to the touch         Head:  normocephalic       Eyes:  pupils equal and round       ENT:  no pallor or cyanosis       Neck:  no stiffness       Respiratory:  clear to auscultation; normal symmetry, decreased bilaterally        Cardiac: regular rate and rhythm; normal S1 and S2                 pulses full and equal, decreased heart sounds    GI:  abdomen soft, nondistended     Extremities and Muscular Skeletal:  no edema        Neurological:  affect appropriate; no gross motor deficits       Psych:  Alert and Oriented x 3 , appropriate affact        Thank you for allowing me to participate in the care of your patient.      Sincerely,     JEANIE Medrano CNP     [facility]  cc:   No referring provider defined for this encounter.

## 2022-07-07 NOTE — PROGRESS NOTES
Cardiology Clinic Progress Note  Robbie Holley MRN# [id number]   YOB: 1965 Age: 56 year old      Primary Cardiologist:   Dr. Álvarez          History of Presenting Illness:      Robbie Holley is a pleasant 56 year old patient with a past cardiac history significant for   1. CAD  2. Ischemic cardiomyopathy  3. Hypertension  4. Hyperlipidemia  Past medical history significant for prior tobacco use.    He has a history of CAD with PCI to the LAD in 2000 after a positive stress test.  He had PCI of the circumflex in 2013 and PCI of proximal LAD in 2017.    In February 2022 he had anterior STEMI s/p stent of the proximal LAD with residual moderate disease elsewhere.  Echocardiogram at that time showed EF 25 to 30% with wall motion abnormalities in the LAD.    Patient was seen by Dr. Álvarez for hospital follow-up.  He was trying to change his lifestyle drastically and was eating healthy and exercising regularly.  He was trying to quit smoking.  He was started on losartan.    Pt presents today for 3-month follow-up.  ALT and AST normalized.  BMP 3/4/2022, after starting losartan, shows normal renal function and electrolytes. Echocardiogram 5/16/2022 showing EF slightly improved to 30 to 35%, continued with wall motion abnormalities, RV normal size and function, no significant valvular disease, and ascending aorta 3.9 cm.  Results reviewed today.  This was reviewed by Dr. Álvarez who recommended increasing losartan and adding spironolactone.  Would hold off on referring to EP for ICD at this point.  Results reviewed today.    He has increased losartan to 25 mg daily and denies any side effects with this.  Blood pressure today is well controlled.  Weight today in the clinic is up 4 pounds over the last 5 months.  He has not been checking daily weights at home.  I stressed the importance of daily weights and have given him written information about low-sodium diet today.  He denies any heart failure symptoms.   He was previously exercising walking 2 miles per day but more recently has not been.  He will work on restarting this.  I congratulated him on smoking cessation.  However, he has been vaping but is trying to cut back on the nicotine and ultimately trying to quit.  He denies any anginal symptoms with exercise.  He is agreeable to adding spironolactone for cardiomyopathy.  We had a discussion regarding interactions with nitroglycerin and sildenafil and he is aware that he should not take these within 24 hours of each other. Patient reports no chest pain, shortness of breath, PND, orthopnea, presyncope, syncope, edema, heart racing, or palpitations.    Labs:  LIPID RESULTS:  Lab Results   Component Value Date    CHOL 141 12/01/2017    CHOL 154 12/21/2016    HDL 25 (L) 12/01/2017    HDL 35 (L) 12/21/2016    LDL 89 12/01/2017    LDL 87 12/21/2016    TRIG 134 12/01/2017    TRIG 161 (H) 12/21/2016    CHOLHDLRATIO 4.0 01/21/2014       LIVER ENZYME RESULTS:  Lab Results   Component Value Date    AST 17 03/04/2022    AST 30 02/13/2008    ALT 32 03/04/2022    ALT 50 08/19/2013       CBC RESULTS:  Lab Results   Component Value Date    WBC 8.6 10/12/2016    RBC 5.26 10/12/2016    HGB 16.8 10/12/2016    HCT 47.9 10/12/2016    MCV 91 10/12/2016    MCH 31.9 10/12/2016    MCHC 35.1 10/12/2016    RDW 12.6 10/12/2016     10/12/2016       BMP RESULTS:  Lab Results   Component Value Date     03/04/2022     09/06/2016    POTASSIUM 4.5 03/04/2022    POTASSIUM 4.5 09/06/2016    CHLORIDE 108 03/04/2022    CHLORIDE 110 (H) 09/06/2016    CO2 29 03/04/2022    CO2 21 09/06/2016    ANIONGAP 3 03/04/2022    ANIONGAP 9 09/06/2016     (H) 03/04/2022    GLC 77 10/12/2016    BUN 12 03/04/2022    BUN 11 09/06/2016    CR 0.99 03/04/2022    CR 0.78 09/06/2016    GFRESTIMATED 89 03/04/2022    GFRESTIMATED >90  Non  GFR Calc   09/06/2016    GFRESTBLACK >90   GFR Calc   09/06/2016    NATALYA 9.3  03/04/2022    NATALYA 8.9 09/06/2016        A1C RESULTS:  Lab Results   Component Value Date    A1C 5.4 08/19/2013       INR RESULTS:  Lab Results   Component Value Date    INR 1.6 (A) 12/30/2011    INR 1.3 (A) 12/27/2011    INR 1.31 (H) 12/20/2011    INR 1.11 12/19/2011       Results reviewed today.       Current Cardiac Medications     Aspirin 81 mg daily  Atorvastatin 40 mg daily   Losartan 25 mg daily  Metoprolol XL 50 mg daily  Nitroglycerin as needed  Effient 10 mg daily  Sildenafil as needed                   Assessment and Plan:       Plan    Patient Instructions   Medication Changes:  5. START spironolactone 12.5 mg daily for CM    Recommendations:  1. Call if blood pressure is less than 90 on top or less than 100 with lightheadedness.    2. Check daily weights and call the clinic if your weight has increased more than 2 lbs in one day or 5 lbs in one week; if you feel more short of breath or have worsening swelling in your legs or abdomen.  3. 2000 mg sodium diet   4. 4-8 8 ounce glasses of fluids per day  5. Discuss risk of stroke with primary care     Follow-up:  1. Fasting lab (lipid/ALT) -after previously starting statin  2.  Nonfasting lab in 1-2 weeks (BMP)-after starting spironolactone and previously increasing losartan  3. See Michaelle TIAN for cardiology follow up at [facility]: 2-4 weeks to uptitrate cardiomyopathy medications    Cardiology Scheduling~[phone]  Cardiology Clinic RN~[phone] (Sylvia Lee RN)        1. CAD    2000 PCI to LAD    2013 PCI to LCx    2017 PCI to proximal LAD    2/2022 anterior STEMI stent to proximal LAD    No angina    Continue statin, aspirin, ARB, beta-blocker    Continue DAPT, uninterrupted, for at least 1 year (2/2023)-possibly longer given STEMI and stent to proximal LAD      2. Ischemic cardiomyopathy    EF 25 to 30% at time of STEMI 2/2022, slightly improved 30 to 35% 5/2022    No signs of heart failure    Continue ARB, beta-blocker, start  spironolactone    EP consult, to discuss ICD, if EF remains less than 35%      3. Hyperlipidemia    No lipids on file    Continue atorvastatin 40 mg daily    Reassess lipids on statin      4. Hypertension    Controlled    Continue lisinopril, metoprolol, spironolactone               Thank you for allowing me to participate in this delightful patient's care.      This note was completed in part using Dragon voice recognition software. Although reviewed after completion, some word and grammatical errors may occur.    Michaelle Styles, APRN CNP, APRN, CNP           Data:   All laboratory data reviewed      Total time spent today was 35 mins, reviewing labs, testing, notes, documenting notes, and seeing patient.          Constitutional:  cooperative, alert and oriented, well developed, well nourished, in no acute distress  overweight    Skin:  warm and dry to the touch         Head:  normocephalic       Eyes:  pupils equal and round       ENT:  no pallor or cyanosis       Neck:  no stiffness       Respiratory:  clear to auscultation; normal symmetry, decreased bilaterally        Cardiac: regular rate and rhythm; normal S1 and S2                 pulses full and equal, decreased heart sounds    GI:  abdomen soft, nondistended     Extremities and Muscular Skeletal:  no edema        Neurological:  affect appropriate; no gross motor deficits       Psych:  Alert and Oriented x 3 , appropriate affact

## 2022-07-07 NOTE — PATIENT INSTRUCTIONS
Medication Changes:  START spironolactone 12.5 mg daily     Recommendations:  Call if blood pressure is less than 90 on top or less than 100 with lightheadedness.    Check daily weights and call the clinic if your weight has increased more than 2 lbs in one day or 5 lbs in one week; if you feel more short of breath or have worsening swelling in your legs or abdomen.  2000 mg sodium diet   4-8 8 ounce glasses of fluids per day  Discuss risk of stroke with primary care     Follow-up:  Fasting lab (lipid/ALT)   Nonfasting lab in 1-2 weeks (BMP)  See Michaelle TIAN for cardiology follow up at [facility]: 2-4 weeks.     Cardiology Scheduling~[phone]  Cardiology Clinic RN~[phone] (Sylvia Lee RN)

## 2022-07-14 ENCOUNTER — LAB (OUTPATIENT)
Dept: LAB | Facility: CLINIC | Age: 57
End: 2022-07-14
Payer: COMMERCIAL

## 2022-07-14 DIAGNOSIS — I25.5 ISCHEMIC CARDIOMYOPATHY: ICD-10-CM

## 2022-07-14 DIAGNOSIS — E78.5 HYPERLIPIDEMIA LDL GOAL <70: ICD-10-CM

## 2022-07-14 LAB
ALT SERPL W P-5'-P-CCNC: 47 U/L (ref 0–70)
ANION GAP SERPL CALCULATED.3IONS-SCNC: 7 MMOL/L (ref 3–14)
BUN SERPL-MCNC: 16 MG/DL (ref 7–30)
CALCIUM SERPL-MCNC: 9.1 MG/DL (ref 8.5–10.1)
CHLORIDE BLD-SCNC: 105 MMOL/L (ref 94–109)
CHOLEST SERPL-MCNC: 116 MG/DL
CO2 SERPL-SCNC: 24 MMOL/L (ref 20–32)
CREAT SERPL-MCNC: 0.88 MG/DL (ref 0.66–1.25)
FASTING STATUS PATIENT QL REPORTED: YES
GFR SERPL CREATININE-BSD FRML MDRD: >90 ML/MIN/1.73M2
GLUCOSE BLD-MCNC: 109 MG/DL (ref 70–99)
HDLC SERPL-MCNC: 44 MG/DL
LDLC SERPL CALC-MCNC: 57 MG/DL
NONHDLC SERPL-MCNC: 72 MG/DL
POTASSIUM BLD-SCNC: 4.7 MMOL/L (ref 3.4–5.3)
SODIUM SERPL-SCNC: 136 MMOL/L (ref 133–144)
TRIGL SERPL-MCNC: 75 MG/DL

## 2022-07-14 PROCEDURE — 84460 ALANINE AMINO (ALT) (SGPT): CPT

## 2022-07-14 PROCEDURE — 80048 BASIC METABOLIC PNL TOTAL CA: CPT

## 2022-07-14 PROCEDURE — 80061 LIPID PANEL: CPT

## 2022-07-14 PROCEDURE — 36415 COLL VENOUS BLD VENIPUNCTURE: CPT

## 2022-07-14 NOTE — LETTER
July 18, 2022      Robbie Diazrd  [address] [address]        Dear ,    We are writing to inform you of your test results. All your labs look good. No changes at this time. Follow up with Michaelle Barker NP on 8/4/22 as scheduled.     Resulted Orders   Basic metabolic panel   Result Value Ref Range    Sodium 136 133 - 144 mmol/L    Potassium 4.7 3.4 - 5.3 mmol/L    Chloride 105 94 - 109 mmol/L    Carbon Dioxide (CO2) 24 20 - 32 mmol/L    Anion Gap 7 3 - 14 mmol/L    Urea Nitrogen 16 7 - 30 mg/dL    Creatinine 0.88 0.66 - 1.25 mg/dL    Calcium 9.1 8.5 - 10.1 mg/dL    Glucose 109 (H) 70 - 99 mg/dL    GFR Estimate >90 >60 mL/min/1.73m2      Comment:      Effective December 21, 2021 eGFRcr in adults is calculated using the 2021 CKD-EPI creatinine equation which includes age and gender (Lisa et al., NEJ, DOI: 10.1056/HUOAuj0745259)   ALT   Result Value Ref Range    ALT 47 0 - 70 U/L   Lipid panel reflex to direct LDL Fasting   Result Value Ref Range    Cholesterol 116 <200 mg/dL    Triglycerides 75 <150 mg/dL    Direct Measure HDL 44 >=40 mg/dL    LDL Cholesterol Calculated 57 <=100 mg/dL    Non HDL Cholesterol 72 <130 mg/dL    Patient Fasting > 8hrs? Yes     Narrative    Cholesterol  Desirable:  <200 mg/dL    Triglycerides  Normal:  Less than 150 mg/dL  Borderline High:  150-199 mg/dL  High:  200-499 mg/dL  Very High:  Greater than or equal to 500 mg/dL    Direct Measure HDL  Female:  Greater than or equal to 50 mg/dL   Male:  Greater than or equal to 40 mg/dL    LDL Cholesterol  Desirable:  <100mg/dL  Above Desirable:  100-129 mg/dL   Borderline High:  130-159 mg/dL   High:  160-189 mg/dL   Very High:  >= 190 mg/dL    Non HDL Cholesterol  Desirable:  130 mg/dL  Above Desirable:  130-159 mg/dL  Borderline High:  160-189 mg/dL  High:  190-219 mg/dL  Very High:  Greater than or equal to 220 mg/dL       If you have any questions or concerns, please call the clinic at the number listed above.        Sincerely,      JEANIE Scott CNP

## 2022-08-04 ENCOUNTER — OFFICE VISIT (OUTPATIENT)
Dept: CARDIOLOGY | Facility: CLINIC | Age: 57
End: 2022-08-04
Attending: NURSE PRACTITIONER
Payer: COMMERCIAL

## 2022-08-04 VITALS
WEIGHT: 216.8 LBS | SYSTOLIC BLOOD PRESSURE: 110 MMHG | TEMPERATURE: 97.6 F | OXYGEN SATURATION: 98 % | HEART RATE: 79 BPM | DIASTOLIC BLOOD PRESSURE: 78 MMHG | BODY MASS INDEX: 30.67 KG/M2

## 2022-08-04 DIAGNOSIS — E78.5 HYPERLIPIDEMIA LDL GOAL <70: ICD-10-CM

## 2022-08-04 DIAGNOSIS — I77.810 DILATATION OF THORACIC AORTA (H): Primary | ICD-10-CM

## 2022-08-04 DIAGNOSIS — I10 BENIGN ESSENTIAL HYPERTENSION: ICD-10-CM

## 2022-08-04 DIAGNOSIS — I25.10 CORONARY ARTERY DISEASE INVOLVING NATIVE CORONARY ARTERY OF NATIVE HEART WITHOUT ANGINA PECTORIS: ICD-10-CM

## 2022-08-04 DIAGNOSIS — I25.5 ISCHEMIC CARDIOMYOPATHY: ICD-10-CM

## 2022-08-04 PROCEDURE — 99214 OFFICE O/P EST MOD 30 MIN: CPT | Performed by: NURSE PRACTITIONER

## 2022-08-04 RX ORDER — SPIRONOLACTONE 25 MG/1
25 TABLET ORAL DAILY
Qty: 30 TABLET | Refills: 11 | Status: SHIPPED | OUTPATIENT
Start: 2022-08-04 | End: 2022-08-26

## 2022-08-04 NOTE — PATIENT INSTRUCTIONS
Medication Changes:  INCREASE spironolactone to 25 mg daily.      Recommendations:  Call if blood pressure is less than 90 on top or less than 100 with lightheadedness.    Check daily weights and call the clinic if your weight has increased more than 2 lbs in one day or 5 lbs in one week; if you feel more short of breath or have worsening swelling in your legs or abdomen.  2000 mg sodium diet   4-8 8 ounce glasses of fluids per day     Follow-up:   Nonfasting lab in 1-2 weeks (BALDEMAR)  See Michaelle TIAN for cardiology follow up at [facility]: 2-4 weeks.    Cardiology Scheduling~[phone]  Cardiology Clinic RN~[phone] (Sylvia Lee RN)

## 2022-08-04 NOTE — LETTER
8/4/2022    [facility]  [address] [address]    RE: Robbie Holley       Dear Colleague,     I had the pleasure of seeing Robbie Holley in the [facility].  Cardiology Clinic Progress Note  Robbie Holley MRN# [id number]   YOB: 1965 Age: 56 year old      Primary Cardiologist:   Dr. Álvarez          History of Presenting Illness:      Robbie Holley is a pleasant 56 year old patient with a past cardiac history significant for   1. CAD    2000 Positive stress test- PCI to the LAD    2013 PCI of circumflex    2017 PCI of pLAD    2/2022 anterior STEMI stent to pLAD, residual moderate disease  2. Ischemic cardiomyopathy    EF 25 to 30% at time of STEMI 2022    Improved to 30 to 35% 5/2022  3. Hypertension  4. Hyperlipidemia  5. Thoracic aortic dilatation    2022 ascending aorta 3.9 cm  Past medical history significant for prior tobacco use.      Patient was seen by Dr. Álvarez February 2022, for hospital follow-up.  He was trying to change his lifestyle drastically and was eating healthy and exercising regularly.  He was trying to quit smoking.  He was started on losartan.    Patient was seen by me in July 2022.  Echocardiogram showed EF improved to 30 to 35% and continued with WMA's, ascending aorta 3.9 cm.  Cardiomyopathy medications were uptitrated.  Dr. Álvarez recommended waiting on EP for ICD at this point.  He was given heart failure education he was previously walking 2 miles per day but had not been doing this.   He quit smoking cigarettes but was still vaping and trying to cut back on the nicotine.    Pt presents today for 1 month follow-up. BMP 7/14/2022 after starting spironolactone shows normal renal function and electrolytes.  Annual lipid profile is well controlled and ALT WNL.  Results reviewed today.     He denies any side effects after starting spironolactone.  Blood pressure today is 110/78.  Weight is up 5 pounds in the last month.   Unfortunately, he has not purchased a scale but believes the weight gain is caloric.  He denies any heart failure symptoms.  He is agreeable to uptitrating spironolactone as he is having trouble cutting the pills.  He plans to purchase a scale this week.  We also discussed restarting his daily walks as he has not done this yet.  He has further cut back on his nicotine with vaping and is down to 7%.  He continues to feel anxious especially Mondays and Tuesdays, when starting the work week, and generally feels better by the end of the week.  Recommended following up with PCP to potentially start some counseling as he has a history of anxiety. Patient reports no chest pain, shortness of breath, PND, orthopnea, presyncope, syncope, edema, heart racing, or palpitations.    Labs:  LIPID RESULTS:  Lab Results   Component Value Date    CHOL 116 07/14/2022    CHOL 154 12/21/2016    HDL 44 07/14/2022    HDL 35 (L) 12/21/2016    LDL 57 07/14/2022    LDL 87 12/21/2016    TRIG 75 07/14/2022    TRIG 161 (H) 12/21/2016    CHOLHDLRATIO 4.0 01/21/2014       LIVER ENZYME RESULTS:  Lab Results   Component Value Date    AST 17 03/04/2022    AST 30 02/13/2008    ALT 47 07/14/2022    ALT 50 08/19/2013       CBC RESULTS:  Lab Results   Component Value Date    WBC 8.6 10/12/2016    RBC 5.26 10/12/2016    HGB 16.8 10/12/2016    HCT 47.9 10/12/2016    MCV 91 10/12/2016    MCH 31.9 10/12/2016    MCHC 35.1 10/12/2016    RDW 12.6 10/12/2016     10/12/2016       BMP RESULTS:  Lab Results   Component Value Date     07/14/2022     09/06/2016    POTASSIUM 4.7 07/14/2022    POTASSIUM 4.5 09/06/2016    CHLORIDE 105 07/14/2022    CHLORIDE 110 (H) 09/06/2016    CO2 24 07/14/2022    CO2 21 09/06/2016    ANIONGAP 7 07/14/2022    ANIONGAP 9 09/06/2016     (H) 07/14/2022    GLC 77 10/12/2016    BUN 16 07/14/2022    BUN 11 09/06/2016    CR 0.88 07/14/2022    CR 0.78 09/06/2016    GFRESTIMATED >90 07/14/2022    GFRESTIMATED >90  Non   GFR Calc   09/06/2016    GFRESTBLACK >90   GFR Calc   09/06/2016    NATALYA 9.1 07/14/2022    NATALYA 8.9 09/06/2016        A1C RESULTS:  Lab Results   Component Value Date    A1C 5.4 08/19/2013       INR RESULTS:  Lab Results   Component Value Date    INR 1.6 (A) 12/30/2011    INR 1.3 (A) 12/27/2011    INR 1.31 (H) 12/20/2011    INR 1.11 12/19/2011       Results reviewed today.       Current Cardiac Medications     Aspirin 81 mg daily  Atorvastatin 40 mg daily   Losartan 25 mg daily  Metoprolol XL 50 mg daily  Nitroglycerin as needed  Effient 10 mg daily  Sildenafil as needed- has been educated on interaction with nitroglycerin  Spironolactone 12.5 mg daily                   Assessment and Plan:       Plan    Patient Instructions   Medication Changes:  6. INCREASE spironolactone to 25 mg daily for CM     Recommendations:  1. Call if blood pressure is less than 90 on top or less than 100 with lightheadedness.    2. Check daily weights and call the clinic if your weight has increased more than 2 lbs in one day or 5 lbs in one week; if you feel more short of breath or have worsening swelling in your legs or abdomen.  3. 2000 mg sodium diet   4. 4-8 8 ounce glasses of fluids per day     Follow-up:  1.  Nonfasting lab in 1-2 weeks (BMP)  2. See Michaelle TIAN for cardiology follow up at [facility]: 2-4 weeks.    Cardiology Scheduling~[phone]  Cardiology Clinic RN~[phone] (Sylvia Lee RN)          1. CAD    Multiple PCI's and STEMI 2/2022    No angina     Continue statin, aspirin, ARB, beta-blocker    Continue DAPT, uninterrupted, for at least 1 year (2/2023)-possibly longer given STEMI and stent to proximal LAD      2. Ischemic cardiomyopathy    Reduced EF    No signs of heart failure     Continue ARB, beta-blocker, spironolactone    Reassess with echo    EP consult, to discuss ICD, if EF remains less than 35%      3. Hyperlipidemia    Last LDL 57 on 7/2022    Continue atorvastatin  40 mg daily      4. Hypertension    Controlled    Continue lisinopril, metoprolol, spironolactone      5.  Thoracic arctic dilatation    BP control 120/80    Follow-up echo         Thank you for allowing me to participate in this delightful patient's care.      This note was completed in part using Dragon voice recognition software. Although reviewed after completion, some word and grammatical errors may occur.    JEANIE Medrano CNP, JEANIE, CNP           Data:   All laboratory data reviewed      Total time spent today was 31 mins, reviewing labs, testing, notes, documenting notes, and seeing patient.          Constitutional:  cooperative, alert and oriented, well developed, well nourished, in no acute distress  overweight    Skin:  warm and dry to the touch         Head:  normocephalic       Eyes:  pupils equal and round       ENT:  no pallor or cyanosis       Neck:  no stiffness       Respiratory:  clear to auscultation; normal symmetry, decreased bilaterally        Cardiac: regular rate and rhythm; normal S1 and S2                 pulses full and equal, decreased heart sounds    GI:  abdomen soft, nondistended     Extremities and Muscular Skeletal:  no edema        Neurological:  affect appropriate; no gross motor deficits       Psych:  Alert and Oriented x 3 , appropriate affact    Thank you for allowing me to participate in the care of your patient.      Sincerely,     JEANIE Medrano CNP     [facility]  cc:   JEANIE Scott CNP  [address]  [place] [address]

## 2022-08-04 NOTE — PROGRESS NOTES
Cardiology Clinic Progress Note  Robbie Holley MRN# [id number]   YOB: 1965 Age: 56 year old      Primary Cardiologist:   Dr. Álvarez          History of Presenting Illness:      Robbie Holley is a pleasant 56 year old patient with a past cardiac history significant for   1. CAD    2000 Positive stress test- PCI to the LAD    2013 PCI of circumflex    2017 PCI of pLAD    2/2022 anterior STEMI stent to pLAD, residual moderate disease  2. Ischemic cardiomyopathy    EF 25 to 30% at time of STEMI 2022    Improved to 30 to 35% 5/2022  3. Hypertension  4. Hyperlipidemia  5. Thoracic aortic dilatation    2022 ascending aorta 3.9 cm  Past medical history significant for prior tobacco use.      Patient was seen by Dr. Álvarez February 2022, for hospital follow-up.  He was trying to change his lifestyle drastically and was eating healthy and exercising regularly.  He was trying to quit smoking.  He was started on losartan.    Patient was seen by me in July 2022.  Echocardiogram showed EF improved to 30 to 35% and continued with WMA's, ascending aorta 3.9 cm.  Cardiomyopathy medications were uptitrated.  Dr. Álvarez recommended waiting on EP for ICD at this point.  He was given heart failure education he was previously walking 2 miles per day but had not been doing this.   He quit smoking cigarettes but was still vaping and trying to cut back on the nicotine.    Pt presents today for 1 month follow-up. BMP 7/14/2022 after starting spironolactone shows normal renal function and electrolytes.  Annual lipid profile is well controlled and ALT WNL.  Results reviewed today.     He denies any side effects after starting spironolactone.  Blood pressure today is 110/78.  Weight is up 5 pounds in the last month.  Unfortunately, he has not purchased a scale but believes the weight gain is caloric.  He denies any heart failure symptoms.  He is agreeable to uptitrating spironolactone as he is having trouble cutting the pills.   He plans to purchase a scale this week.  We also discussed restarting his daily walks as he has not done this yet.  He has further cut back on his nicotine with vaping and is down to 7%.  He continues to feel anxious especially Mondays and Tuesdays, when starting the work week, and generally feels better by the end of the week.  Recommended following up with PCP to potentially start some counseling as he has a history of anxiety. Patient reports no chest pain, shortness of breath, PND, orthopnea, presyncope, syncope, edema, heart racing, or palpitations.    Labs:  LIPID RESULTS:  Lab Results   Component Value Date    CHOL 116 07/14/2022    CHOL 154 12/21/2016    HDL 44 07/14/2022    HDL 35 (L) 12/21/2016    LDL 57 07/14/2022    LDL 87 12/21/2016    TRIG 75 07/14/2022    TRIG 161 (H) 12/21/2016    CHOLHDLRATIO 4.0 01/21/2014       LIVER ENZYME RESULTS:  Lab Results   Component Value Date    AST 17 03/04/2022    AST 30 02/13/2008    ALT 47 07/14/2022    ALT 50 08/19/2013       CBC RESULTS:  Lab Results   Component Value Date    WBC 8.6 10/12/2016    RBC 5.26 10/12/2016    HGB 16.8 10/12/2016    HCT 47.9 10/12/2016    MCV 91 10/12/2016    MCH 31.9 10/12/2016    MCHC 35.1 10/12/2016    RDW 12.6 10/12/2016     10/12/2016       BMP RESULTS:  Lab Results   Component Value Date     07/14/2022     09/06/2016    POTASSIUM 4.7 07/14/2022    POTASSIUM 4.5 09/06/2016    CHLORIDE 105 07/14/2022    CHLORIDE 110 (H) 09/06/2016    CO2 24 07/14/2022    CO2 21 09/06/2016    ANIONGAP 7 07/14/2022    ANIONGAP 9 09/06/2016     (H) 07/14/2022    GLC 77 10/12/2016    BUN 16 07/14/2022    BUN 11 09/06/2016    CR 0.88 07/14/2022    CR 0.78 09/06/2016    GFRESTIMATED >90 07/14/2022    GFRESTIMATED >90  Non  GFR Calc   09/06/2016    GFRESTBLACK >90   GFR Calc   09/06/2016    NATALYA 9.1 07/14/2022    NATALYA 8.9 09/06/2016        A1C RESULTS:  Lab Results   Component Value Date    A1C 5.4  08/19/2013       INR RESULTS:  Lab Results   Component Value Date    INR 1.6 (A) 12/30/2011    INR 1.3 (A) 12/27/2011    INR 1.31 (H) 12/20/2011    INR 1.11 12/19/2011       Results reviewed today.       Current Cardiac Medications     Aspirin 81 mg daily  Atorvastatin 40 mg daily   Losartan 25 mg daily  Metoprolol XL 50 mg daily  Nitroglycerin as needed  Effient 10 mg daily  Sildenafil as needed- has been educated on interaction with nitroglycerin  Spironolactone 12.5 mg daily                   Assessment and Plan:       Plan    Patient Instructions   Medication Changes:  6. INCREASE spironolactone to 25 mg daily for CM     Recommendations:  1. Call if blood pressure is less than 90 on top or less than 100 with lightheadedness.    2. Check daily weights and call the clinic if your weight has increased more than 2 lbs in one day or 5 lbs in one week; if you feel more short of breath or have worsening swelling in your legs or abdomen.  3. 2000 mg sodium diet   4. 4-8 8 ounce glasses of fluids per day     Follow-up:  1.  Nonfasting lab in 1-2 weeks (BMP)  2. See Michaelle TIAN for cardiology follow up at [facility]: 2-4 weeks.    Cardiology Scheduling~[phone]  Cardiology Clinic RN~[phone] (Sylvia Lee RN)          1. CAD    Multiple PCI's and STEMI 2/2022    No angina     Continue statin, aspirin, ARB, beta-blocker    Continue DAPT, uninterrupted, for at least 1 year (2/2023)-possibly longer given STEMI and stent to proximal LAD      2. Ischemic cardiomyopathy    Reduced EF    No signs of heart failure     Continue ARB, beta-blocker, spironolactone    Reassess with echo    EP consult, to discuss ICD, if EF remains less than 35%      3. Hyperlipidemia    Last LDL 57 on 7/2022    Continue atorvastatin 40 mg daily      4. Hypertension    Controlled    Continue lisinopril, metoprolol, spironolactone      5.  Thoracic arctic dilatation    BP control 120/80    Follow-up echo         Thank you for allowing me to  participate in this delightful patient's care.      This note was completed in part using Dragon voice recognition software. Although reviewed after completion, some word and grammatical errors may occur.    Michaelle Styles, JEANIE CNP, APRN, CNP           Data:   All laboratory data reviewed      Total time spent today was 31 mins, reviewing labs, testing, notes, documenting notes, and seeing patient.          Constitutional:  cooperative, alert and oriented, well developed, well nourished, in no acute distress  overweight    Skin:  warm and dry to the touch         Head:  normocephalic       Eyes:  pupils equal and round       ENT:  no pallor or cyanosis       Neck:  no stiffness       Respiratory:  clear to auscultation; normal symmetry, decreased bilaterally        Cardiac: regular rate and rhythm; normal S1 and S2                 pulses full and equal, decreased heart sounds    GI:  abdomen soft, nondistended     Extremities and Muscular Skeletal:  no edema        Neurological:  affect appropriate; no gross motor deficits       Psych:  Alert and Oriented x 3 , appropriate affact

## 2022-08-11 ENCOUNTER — TELEPHONE (OUTPATIENT)
Dept: CARDIOLOGY | Facility: CLINIC | Age: 57
End: 2022-08-11

## 2022-08-11 DIAGNOSIS — I25.10 CORONARY ARTERY DISEASE INVOLVING NATIVE HEART WITHOUT ANGINA PECTORIS, UNSPECIFIED VESSEL OR LESION TYPE: ICD-10-CM

## 2022-08-11 RX ORDER — LOSARTAN POTASSIUM 25 MG/1
25 TABLET ORAL DAILY
Qty: 90 TABLET | Refills: 3 | Status: SHIPPED | OUTPATIENT
Start: 2022-08-11

## 2022-08-11 NOTE — TELEPHONE ENCOUNTER
Prasugrel 10mg tablets are on backorder through our supplier until mid to late August. We are able to get in a BX rated generic to [facility], would it be possible to switch him to that Prasugrel while there is this shortage happening. He has about a weeks worth left of the prescription that he has. Please follow up with us either via Epic or a phone call.           Thank you,  Beverly Zhu [facility] [place]  [phone]

## 2022-08-11 NOTE — TELEPHONE ENCOUNTER
[facility] Call Center    Phone Message    May a detailed message be left on voicemail: yes     Reason for Call: Medication Question or concern regarding medication   Prescription Clarification  Name of Medication: losartan (COZAAR) 25 MG tablet  Prescribing Provider: Michaelle Barker   Pharmacy:  [place] [id number] [place] [address]   What on the order needs clarification?   losartan (COZAAR) 25 MG tablet - script was sent to wrong pharmacy - should have been sent to [place] [address] [place] [address].  PT really wants/needs this refill today.  Please resend the script to [place] [address] [place] [address]    Action Taken: Message routed to:  [facility] cardio    Travel Screening: Not Applicable     ADDENDUM: [facility] Cardiology Refill Guideline reviewed.  Medication meets criteria for refill. Refills sent.  Sylvia Lee RN Cardiology August 11, 2022, 12:41 PM

## 2022-08-18 ENCOUNTER — LAB (OUTPATIENT)
Dept: LAB | Facility: CLINIC | Age: 57
End: 2022-08-18
Payer: COMMERCIAL

## 2022-08-18 DIAGNOSIS — I25.5 ISCHEMIC CARDIOMYOPATHY: ICD-10-CM

## 2022-08-18 LAB
ANION GAP SERPL CALCULATED.3IONS-SCNC: 9 MMOL/L (ref 3–14)
BUN SERPL-MCNC: 13 MG/DL (ref 7–30)
CALCIUM SERPL-MCNC: 9 MG/DL (ref 8.5–10.1)
CHLORIDE BLD-SCNC: 102 MMOL/L (ref 94–109)
CO2 SERPL-SCNC: 25 MMOL/L (ref 20–32)
CREAT SERPL-MCNC: 0.93 MG/DL (ref 0.66–1.25)
GFR SERPL CREATININE-BSD FRML MDRD: >90 ML/MIN/1.73M2
GLUCOSE BLD-MCNC: 98 MG/DL (ref 70–99)
POTASSIUM BLD-SCNC: 4.4 MMOL/L (ref 3.4–5.3)
SODIUM SERPL-SCNC: 136 MMOL/L (ref 133–144)

## 2022-08-18 PROCEDURE — 80048 BASIC METABOLIC PNL TOTAL CA: CPT

## 2022-08-18 PROCEDURE — 36415 COLL VENOUS BLD VENIPUNCTURE: CPT

## 2022-08-19 NOTE — RESULT ENCOUNTER NOTE
Electrolytes and kidney function WNL. Done after increasing spironolactone to 25 mg every day on 8/4/22. Follow up with Michaelle Barker NP on 9/9/22. Pt notified of results

## 2022-08-26 DIAGNOSIS — I25.5 ISCHEMIC CARDIOMYOPATHY: ICD-10-CM

## 2022-08-26 RX ORDER — SPIRONOLACTONE 25 MG/1
25 TABLET ORAL DAILY
Qty: 90 TABLET | Refills: 3 | Status: SHIPPED | OUTPATIENT
Start: 2022-08-26 | End: 2023-06-16

## 2022-08-26 NOTE — TELEPHONE ENCOUNTER
[facility] Cardiology Refill Guideline reviewed.  Medication meets criteria for refill. Refills sent. Sylvia Lee RN Cardiology August 26, 2022, 3:08 PM

## 2022-08-26 NOTE — TELEPHONE ENCOUNTER
Patient requesting refill for Spironolactone 25mg tablet  Quantity: 30  Last refill: 8/4/22  LOV: 8/4/22 Michaelle Barker     Patient plans to  on 8/31/22

## 2022-09-22 ENCOUNTER — OFFICE VISIT (OUTPATIENT)
Dept: CARDIOLOGY | Facility: CLINIC | Age: 57
End: 2022-09-22
Attending: NURSE PRACTITIONER
Payer: COMMERCIAL

## 2022-09-22 VITALS
HEART RATE: 96 BPM | BODY MASS INDEX: 27.75 KG/M2 | WEIGHT: 196.2 LBS | DIASTOLIC BLOOD PRESSURE: 81 MMHG | OXYGEN SATURATION: 97 % | SYSTOLIC BLOOD PRESSURE: 113 MMHG

## 2022-09-22 DIAGNOSIS — I10 BENIGN ESSENTIAL HYPERTENSION: ICD-10-CM

## 2022-09-22 DIAGNOSIS — I25.5 ISCHEMIC CARDIOMYOPATHY: ICD-10-CM

## 2022-09-22 DIAGNOSIS — I77.810 DILATATION OF THORACIC AORTA (H): ICD-10-CM

## 2022-09-22 DIAGNOSIS — E78.5 HYPERLIPIDEMIA LDL GOAL <70: ICD-10-CM

## 2022-09-22 DIAGNOSIS — I25.10 CORONARY ARTERY DISEASE INVOLVING NATIVE CORONARY ARTERY OF NATIVE HEART WITHOUT ANGINA PECTORIS: ICD-10-CM

## 2022-09-22 PROCEDURE — 99214 OFFICE O/P EST MOD 30 MIN: CPT | Performed by: NURSE PRACTITIONER

## 2022-09-22 RX ORDER — METOPROLOL SUCCINATE 100 MG/1
100 TABLET, EXTENDED RELEASE ORAL DAILY
Qty: 30 TABLET | Refills: 11 | Status: SHIPPED | OUTPATIENT
Start: 2022-09-22 | End: 2022-10-20

## 2022-09-22 NOTE — LETTER
9/22/2022    [facility]  [address] [address]    RE: Robbie Holley       Dear Colleague,     I had the pleasure of seeing Robbie Holley in the [facility].  Cardiology Clinic Progress Note  Robbie Holley MRN# [id number]   YOB: 1965 Age: 56 year old      Primary Cardiologist:   Dr. Álvarez          History of Presenting Illness:      Robbie Holley is a pleasant 56 year old patient with a past cardiac history significant for   1. CAD    2000 Positive stress test- PCI to the LAD    2013 PCI of circumflex    2017 PCI of pLAD    2/2022 anterior STEMI stent to pLAD, residual moderate disease  2. Ischemic cardiomyopathy    EF 25 to 30% at time of STEMI 2022    Improved to 30 to 35% 5/2022  3. Hypertension  4. Hyperlipidemia  5. Thoracic aortic dilatation    2022 ascending aorta 3.9 cm  Past medical history significant for prior tobacco use, current vaping, anxiety.      Patient was seen by Dr. Álvarez February 2022, for hospital follow-up.  He was trying to change his lifestyle drastically and was eating healthy and exercising regularly.  He was trying to quit smoking.  He was started on losartan.    Patient was seen by me in July 2022.  Echocardiogram showed EF improved to 30 to 35% and continued with WMA's, ascending aorta 3.9 cm.  Cardiomyopathy medications were uptitrated.  Dr. Álvarez recommended waiting on EP for ICD at this point.  He was given heart failure education he was previously walking 2 miles per day but had not been doing this.   He quit smoking cigarettes but was still vaping and trying to cut back on the nicotine.    Patient was seen by me in August 2022.  Spironolactone was uptitrated to 25 mg daily for cardiomyopathy.  His weight was up 5 pounds in the last month and had not been checking weights at home as he did not purchase a scale yet.  He felt the weight gain was caloric and denied any heart failure symptoms.    Pt presents today for  1 month follow-up. BMP 8/18/2022 after increasing spironolactone shows normal renal function and electrolytes.  Results reviewed today.    He denies any side effects after increasing spironolactone.  He has noticed slightly increased runny nose recently but there is no adverse reaction of this with spironolactone.  Blood pressures at home have been between 107-110 systolic.  Today is 113/81.  Heart rates have remained higher, in the 90s.  He is agreeable to uptitrating metoprolol.  He still has not been checking daily weights at home as he does not have a scale and still has not purchased one.  Weight today in the clinic is down 20 pounds with intentional weight loss.  He denies any heart failure symptoms.  He tells me that he has cut out sodium from his diet and has started walking daily.      He quit vaping but unfortunately returned to smoking 2 to 5 cigarettes/day.  He denies any anginal symptoms.  He continues to try working on weight loss and plans to get down to 175-180 pounds.  Patient reports no chest pain, shortness of breath, PND, orthopnea, presyncope, syncope, edema, heart racing, or palpitations.    Labs:  LIPID RESULTS:  Lab Results   Component Value Date    CHOL 116 07/14/2022    CHOL 154 12/21/2016    HDL 44 07/14/2022    HDL 35 (L) 12/21/2016    LDL 57 07/14/2022    LDL 87 12/21/2016    TRIG 75 07/14/2022    TRIG 161 (H) 12/21/2016    CHOLHDLRATIO 4.0 01/21/2014       LIVER ENZYME RESULTS:  Lab Results   Component Value Date    AST 17 03/04/2022    AST 30 02/13/2008    ALT 47 07/14/2022    ALT 50 08/19/2013       CBC RESULTS:  Lab Results   Component Value Date    WBC 8.6 10/12/2016    RBC 5.26 10/12/2016    HGB 16.8 10/12/2016    HCT 47.9 10/12/2016    MCV 91 10/12/2016    MCH 31.9 10/12/2016    MCHC 35.1 10/12/2016    RDW 12.6 10/12/2016     10/12/2016       BMP RESULTS:  Lab Results   Component Value Date     08/18/2022     09/06/2016    POTASSIUM 4.4 08/18/2022    POTASSIUM  4.5 09/06/2016    CHLORIDE 102 08/18/2022    CHLORIDE 110 (H) 09/06/2016    CO2 25 08/18/2022    CO2 21 09/06/2016    ANIONGAP 9 08/18/2022    ANIONGAP 9 09/06/2016    GLC 98 08/18/2022    GLC 77 10/12/2016    BUN 13 08/18/2022    BUN 11 09/06/2016    CR 0.93 08/18/2022    CR 0.78 09/06/2016    GFRESTIMATED >90 08/18/2022    GFRESTIMATED >90  Non  GFR Calc   09/06/2016    GFRESTBLACK >90   GFR Calc   09/06/2016    NATALYA 9.0 08/18/2022    NATALYA 8.9 09/06/2016        A1C RESULTS:  Lab Results   Component Value Date    A1C 5.4 08/19/2013       INR RESULTS:  Lab Results   Component Value Date    INR 1.6 (A) 12/30/2011    INR 1.3 (A) 12/27/2011    INR 1.31 (H) 12/20/2011    INR 1.11 12/19/2011       Results reviewed today.       Current Cardiac Medications     Aspirin 81 mg daily  Atorvastatin 40 mg daily   Losartan 25 mg daily  Metoprolol XL 50 mg daily  Nitroglycerin as needed  Effient 10 mg daily  Sildenafil as needed- has been educated on interaction with nitroglycerin  Spironolactone 25 mg daily                   Assessment and Plan:       Plan    Patient Instructions   Medication Changes:  6. INCREASE metoprolol XL to 100 mg daily for CM     Recommendations:  1. Check daily weights and call the clinic if your weight has increased more than 2 lbs in one day or 5 lbs in one week; if you feel more short of breath or have worsening swelling in your legs or abdomen.  2. 2000 mg sodium diet   3. 4-8 8 ounce glasses of fluids per day   4. Call if heart rate is less than 50 or less than 55 and lightheaded.   5. Call if blood pressure is less than 90 on top or less than 100 with lightheadedness.       Follow-up:  1. See sadie TIAN  for cardiology follow up at [facility]: 1 month to uptitrate cardiomyopathy medications, if able  Call 6 months prior, to schedule.     Cardiology Scheduling~[phone]  Cardiology Clinic RN~[phone] (Sylvia RN, Jasmin RN, Rachael  RN)            1. CAD    Multiple PCI's and STEMI 2/2022    No angina     Continue statin, aspirin, ARB, beta-blocker    Continue DAPT, uninterrupted, for at least 1 year (2/2023)-possibly longer given STEMI and stent to proximal LAD      2. Ischemic cardiomyopathy    Reduced EF    No signs of heart failure     Continue ARB, beta-blocker, spironolactone    Reassess with echo after uptitrating medication    EP consult, to discuss ICD, if EF remains less than 35%      3. Hyperlipidemia    Last LDL 57 on 7/2022    Continue atorvastatin 40 mg daily      4. Hypertension    Controlled    Continue lisinopril, metoprolol, spironolactone      5.  Thoracic arctic dilatation    BP control 120/80    Follow-up echo         Thank you for allowing me to participate in this delightful patient's care.      This note was completed in part using Dragon voice recognition software. Although reviewed after completion, some word and grammatical errors may occur.    JEANIE Medrano CNP, APRN, CNP           Data:   All laboratory data reviewed           Constitutional:  cooperative, alert and oriented, well developed, well nourished, in no acute distress     Skin:  warm and dry to the touch         Head:  normocephalic       Eyes:  pupils equal and round       ENT:  no pallor or cyanosis       Neck:  no stiffness       Respiratory:  clear to auscultation; normal symmetry, decreased bilaterally        Cardiac: regular rate and rhythm; normal S1 and S2                 pulses full and equal, decreased heart sounds    GI:  abdomen soft, nondistended     Extremities and Muscular Skeletal:  no edema        Neurological:  affect appropriate; no gross motor deficits       Psych:  Alert and Oriented x 3 , appropriate affact    Thank you for allowing me to participate in the care of your patient.      Sincerely,     JEANIE Medrano CNP     [facility]  cc:   Michaelle Barker, APRN CNP  [address]  [place] [address]

## 2022-09-22 NOTE — PATIENT INSTRUCTIONS
Medication Changes:  INCREASE metoprolol XL to 100 mg daily    Recommendations:  Check daily weights and call the clinic if your weight has increased more than 2 lbs in one day or 5 lbs in one week; if you feel more short of breath or have worsening swelling in your legs or abdomen.  2000 mg sodium diet   4-8 8 ounce glasses of fluids per day   Call if heart rate is less than 50 or less than 55 and lightheaded.   Call if blood pressure is less than 90 on top or less than 100 with lightheadedness.       Follow-up:  See sadie TIAN  for cardiology follow up at [facility]: 1 month.   Call 6 months prior, to schedule.     Cardiology Scheduling~[phone]  Cardiology Clinic RN~[phone] (Sylvia RN, Jasmin RN, Rachael RN)

## 2022-09-22 NOTE — PROGRESS NOTES
Cardiology Clinic Progress Note  Robbie Holley MRN# [id number]   YOB: 1965 Age: 56 year old      Primary Cardiologist:   Dr. Álvarez          History of Presenting Illness:      Robbie Holley is a pleasant 56 year old patient with a past cardiac history significant for   1. CAD    2000 Positive stress test- PCI to the LAD    2013 PCI of circumflex    2017 PCI of pLAD    2/2022 anterior STEMI stent to pLAD, residual moderate disease  2. Ischemic cardiomyopathy    EF 25 to 30% at time of STEMI 2022    Improved to 30 to 35% 5/2022  3. Hypertension  4. Hyperlipidemia  5. Thoracic aortic dilatation    2022 ascending aorta 3.9 cm  Past medical history significant for prior tobacco use, current vaping, anxiety.      Patient was seen by Dr. Álvarez February 2022, for hospital follow-up.  He was trying to change his lifestyle drastically and was eating healthy and exercising regularly.  He was trying to quit smoking.  He was started on losartan.    Patient was seen by me in July 2022.  Echocardiogram showed EF improved to 30 to 35% and continued with WMA's, ascending aorta 3.9 cm.  Cardiomyopathy medications were uptitrated.  Dr. Álvarez recommended waiting on EP for ICD at this point.  He was given heart failure education he was previously walking 2 miles per day but had not been doing this.   He quit smoking cigarettes but was still vaping and trying to cut back on the nicotine.    Patient was seen by me in August 2022.  Spironolactone was uptitrated to 25 mg daily for cardiomyopathy.  His weight was up 5 pounds in the last month and had not been checking weights at home as he did not purchase a scale yet.  He felt the weight gain was caloric and denied any heart failure symptoms.    Pt presents today for 1 month follow-up. BMP 8/18/2022 after increasing spironolactone shows normal renal function and electrolytes.  Results reviewed today.    He denies any side effects after increasing spironolactone.  He has  noticed slightly increased runny nose recently but there is no adverse reaction of this with spironolactone.  Blood pressures at home have been between 107-110 systolic.  Today is 113/81.  Heart rates have remained higher, in the 90s.  He is agreeable to uptitrating metoprolol.  He still has not been checking daily weights at home as he does not have a scale and still has not purchased one.  Weight today in the clinic is down 20 pounds with intentional weight loss.  He denies any heart failure symptoms.  He tells me that he has cut out sodium from his diet and has started walking daily.      He quit vaping but unfortunately returned to smoking 2 to 5 cigarettes/day.  He denies any anginal symptoms.  He continues to try working on weight loss and plans to get down to 175-180 pounds.  Patient reports no chest pain, shortness of breath, PND, orthopnea, presyncope, syncope, edema, heart racing, or palpitations.    Labs:  LIPID RESULTS:  Lab Results   Component Value Date    CHOL 116 07/14/2022    CHOL 154 12/21/2016    HDL 44 07/14/2022    HDL 35 (L) 12/21/2016    LDL 57 07/14/2022    LDL 87 12/21/2016    TRIG 75 07/14/2022    TRIG 161 (H) 12/21/2016    CHOLHDLRATIO 4.0 01/21/2014       LIVER ENZYME RESULTS:  Lab Results   Component Value Date    AST 17 03/04/2022    AST 30 02/13/2008    ALT 47 07/14/2022    ALT 50 08/19/2013       CBC RESULTS:  Lab Results   Component Value Date    WBC 8.6 10/12/2016    RBC 5.26 10/12/2016    HGB 16.8 10/12/2016    HCT 47.9 10/12/2016    MCV 91 10/12/2016    MCH 31.9 10/12/2016    MCHC 35.1 10/12/2016    RDW 12.6 10/12/2016     10/12/2016       BMP RESULTS:  Lab Results   Component Value Date     08/18/2022     09/06/2016    POTASSIUM 4.4 08/18/2022    POTASSIUM 4.5 09/06/2016    CHLORIDE 102 08/18/2022    CHLORIDE 110 (H) 09/06/2016    CO2 25 08/18/2022    CO2 21 09/06/2016    ANIONGAP 9 08/18/2022    ANIONGAP 9 09/06/2016    GLC 98 08/18/2022    GLC 77 10/12/2016     BUN 13 08/18/2022    BUN 11 09/06/2016    CR 0.93 08/18/2022    CR 0.78 09/06/2016    GFRESTIMATED >90 08/18/2022    GFRESTIMATED >90  Non  GFR Calc   09/06/2016    GFRESTBLACK >90   GFR Calc   09/06/2016    NATALYA 9.0 08/18/2022    NATALYA 8.9 09/06/2016        A1C RESULTS:  Lab Results   Component Value Date    A1C 5.4 08/19/2013       INR RESULTS:  Lab Results   Component Value Date    INR 1.6 (A) 12/30/2011    INR 1.3 (A) 12/27/2011    INR 1.31 (H) 12/20/2011    INR 1.11 12/19/2011       Results reviewed today.       Current Cardiac Medications     Aspirin 81 mg daily  Atorvastatin 40 mg daily   Losartan 25 mg daily  Metoprolol XL 50 mg daily  Nitroglycerin as needed  Effient 10 mg daily  Sildenafil as needed- has been educated on interaction with nitroglycerin  Spironolactone 25 mg daily                   Assessment and Plan:       Plan    Patient Instructions   Medication Changes:  6. INCREASE metoprolol XL to 100 mg daily for CM     Recommendations:  1. Check daily weights and call the clinic if your weight has increased more than 2 lbs in one day or 5 lbs in one week; if you feel more short of breath or have worsening swelling in your legs or abdomen.  2. 2000 mg sodium diet   3. 4-8 8 ounce glasses of fluids per day   4. Call if heart rate is less than 50 or less than 55 and lightheaded.   5. Call if blood pressure is less than 90 on top or less than 100 with lightheadedness.       Follow-up:  1. See sadie TIAN  for cardiology follow up at [facility]: 1 month to uptitrate cardiomyopathy medications, if able  Call 6 months prior, to schedule.     Cardiology Scheduling~[phone]  Cardiology Clinic RN~[phone] (Sylvia RN, Jasmin RN, Rachael RN)            1. CAD    Multiple PCI's and STEMI 2/2022    No angina     Continue statin, aspirin, ARB, beta-blocker    Continue DAPT, uninterrupted, for at least 1 year (2/2023)-possibly longer given STEMI and stent to proximal  LAD      2. Ischemic cardiomyopathy    Reduced EF    No signs of heart failure     Continue ARB, beta-blocker, spironolactone    Reassess with echo after uptitrating medication    EP consult, to discuss ICD, if EF remains less than 35%      3. Hyperlipidemia    Last LDL 57 on 7/2022    Continue atorvastatin 40 mg daily      4. Hypertension    Controlled    Continue lisinopril, metoprolol, spironolactone      5.  Thoracic arctic dilatation    BP control 120/80    Follow-up echo         Thank you for allowing me to participate in this delightful patient's care.      This note was completed in part using Dragon voice recognition software. Although reviewed after completion, some word and grammatical errors may occur.    Michaelle Styles, APRN CNP, APRN, CNP           Data:   All laboratory data reviewed           Constitutional:  cooperative, alert and oriented, well developed, well nourished, in no acute distress     Skin:  warm and dry to the touch         Head:  normocephalic       Eyes:  pupils equal and round       ENT:  no pallor or cyanosis       Neck:  no stiffness       Respiratory:  clear to auscultation; normal symmetry, decreased bilaterally        Cardiac: regular rate and rhythm; normal S1 and S2                 pulses full and equal, decreased heart sounds    GI:  abdomen soft, nondistended     Extremities and Muscular Skeletal:  no edema        Neurological:  affect appropriate; no gross motor deficits       Psych:  Alert and Oriented x 3 , appropriate affact

## 2022-10-20 DIAGNOSIS — I10 BENIGN ESSENTIAL HYPERTENSION: ICD-10-CM

## 2022-10-20 DIAGNOSIS — I25.5 ISCHEMIC CARDIOMYOPATHY: ICD-10-CM

## 2022-10-20 RX ORDER — METOPROLOL SUCCINATE 100 MG/1
100 TABLET, EXTENDED RELEASE ORAL DAILY
Qty: 30 TABLET | Refills: 11 | Status: SHIPPED | OUTPATIENT
Start: 2022-10-20 | End: 2022-11-09

## 2022-10-20 NOTE — TELEPHONE ENCOUNTER
[facility] Cardiology Refill Guideline reviewed.  Medication meets criteria for refill.  Angelita Bergeron RN

## 2022-10-20 NOTE — TELEPHONE ENCOUNTER
Last Office Visit: 09/22/22 CATHRYN Barker  Next Office Visit: 10/26/22 CATHRYN Barker  Last Filled: 09/22/22  Diandra Gonzales MA Cardiology   10/20/2022 7:43 AM

## 2022-11-09 ENCOUNTER — OFFICE VISIT (OUTPATIENT)
Dept: CARDIOLOGY | Facility: CLINIC | Age: 57
End: 2022-11-09
Payer: COMMERCIAL

## 2022-11-09 VITALS
HEART RATE: 89 BPM | BODY MASS INDEX: 26.31 KG/M2 | OXYGEN SATURATION: 96 % | SYSTOLIC BLOOD PRESSURE: 117 MMHG | WEIGHT: 186 LBS | DIASTOLIC BLOOD PRESSURE: 83 MMHG

## 2022-11-09 DIAGNOSIS — I25.5 ISCHEMIC CARDIOMYOPATHY: ICD-10-CM

## 2022-11-09 DIAGNOSIS — E78.5 HYPERLIPIDEMIA LDL GOAL <70: ICD-10-CM

## 2022-11-09 DIAGNOSIS — I10 BENIGN ESSENTIAL HYPERTENSION: ICD-10-CM

## 2022-11-09 DIAGNOSIS — I25.10 CORONARY ARTERY DISEASE INVOLVING NATIVE CORONARY ARTERY OF NATIVE HEART WITHOUT ANGINA PECTORIS: ICD-10-CM

## 2022-11-09 DIAGNOSIS — I77.810 DILATATION OF THORACIC AORTA (H): ICD-10-CM

## 2022-11-09 PROCEDURE — 99214 OFFICE O/P EST MOD 30 MIN: CPT | Performed by: NURSE PRACTITIONER

## 2022-11-09 RX ORDER — NITROGLYCERIN 0.4 MG/1
0.4 TABLET SUBLINGUAL
Qty: 25 TABLET | Refills: 3 | Status: SHIPPED | OUTPATIENT
Start: 2022-11-09 | End: 2024-03-06

## 2022-11-09 RX ORDER — METOPROLOL SUCCINATE 100 MG/1
150 TABLET, EXTENDED RELEASE ORAL DAILY
Qty: 135 TABLET | Refills: 3 | Status: SHIPPED | OUTPATIENT
Start: 2022-11-09

## 2022-11-09 NOTE — PROGRESS NOTES
Cardiology Clinic Progress Note  Robbie Holley MRN# [id number]   YOB: 1965 Age: 56 year old      Primary Cardiologist:   Dr. Álvarez          History of Presenting Illness:      Robbie Holley is a pleasant 56 year old  patient with a past cardiac history significant for   1. CAD    2000 Positive stress test-> PCI to the LAD    2013 PCI of circumflex    2017 PCI of pLAD     2/2022 anterior STEMI stent to pLAD, residual moderate disease  2. Ischemic cardiomyopathy    EF 25 to 30% at time of STEMI 2022    Improved to 30 to 35% 5/2022  3. Hypertension  4. Hyperlipidemia  5. Thoracic aortic dilatation    7/2022 ascending aorta 3.9 cm  Past medical history significant for prior tobacco use, current vaping, anxiety.      Patient was seen by Dr. Álvarez February 2022, for hospital follow-up.  He was trying to change his lifestyle drastically and was eating healthy and exercising regularly.  He was trying to quit smoking.  He was started on losartan for GDMT.    Patient was seen by me in July 2022.  Echocardiogram 7/2022 showed EF improved to 30 to 35% and continued with WMA's, ascending aorta 3.9 cm.  Cardiomyopathy medications were uptitrated.  Dr. Álvarez recommended waiting on EP for ICD at this point.  He was given heart failure education he was previously walking 2 miles per day but had not been doing this recently.   He quit smoking cigarettes but was still vaping and trying to cut back on the nicotine.    Patient was seen by me in August 2022.  Spironolactone was uptitrated to 25 mg daily for cardiomyopathy.  His weight was up 5 pounds in the last month and had not been checking weights at home as he did not purchase a scale yet.  He felt the weight gain was caloric and denied any heart failure symptoms.    Patient was seen by me in September 2022 and metoprolol was uptitrated for cardiomyopathy.  He still had not purchased a scale, but weight in clinic was down 20 pounds with intentional weight loss and  a goal of 175 to 180 pounds.   He cut out sodium and was walking daily.  He had quit vaping for a short period of time but returned to smoking 2 to 5 cigarettes/day instead.    Pt presents today for 1 month follow-up.  He denies any side effects after increasing metoprolol.  Blood pressure and heart rate are controlled today.  He is agreeable to further uptitrating metoprolol.  He states that he has been having more energy and is feeling well.  Weight is down another 10 pounds in the last 2 months with intentional weight loss and denies any heart failure symptoms.  He denies any angina.  He continues smoking 6 to 7 cigarettes/day but is motivated to quit.  He has been having difficulty getting his cardiology visits and testing covered with his insurance deductible.  He would like to figure this out, prior to scheduling his follow-up echo. Patient reports no chest pain, shortness of breath, PND, orthopnea, presyncope, syncope, edema, heart racing, or palpitations.    Labs:  LIPID RESULTS:  Lab Results   Component Value Date    CHOL 116 07/14/2022    CHOL 154 12/21/2016    HDL 44 07/14/2022    HDL 35 (L) 12/21/2016    LDL 57 07/14/2022    LDL 87 12/21/2016    TRIG 75 07/14/2022    TRIG 161 (H) 12/21/2016    CHOLHDLRATIO 4.0 01/21/2014       LIVER ENZYME RESULTS:  Lab Results   Component Value Date    AST 17 03/04/2022    AST 30 02/13/2008    ALT 47 07/14/2022    ALT 50 08/19/2013       CBC RESULTS:  Lab Results   Component Value Date    WBC 8.6 10/12/2016    RBC 5.26 10/12/2016    HGB 16.8 10/12/2016    HCT 47.9 10/12/2016    MCV 91 10/12/2016    MCH 31.9 10/12/2016    MCHC 35.1 10/12/2016    RDW 12.6 10/12/2016     10/12/2016       BMP RESULTS:  Lab Results   Component Value Date     08/18/2022     09/06/2016    POTASSIUM 4.4 08/18/2022    POTASSIUM 4.5 09/06/2016    CHLORIDE 102 08/18/2022    CHLORIDE 110 (H) 09/06/2016    CO2 25 08/18/2022    CO2 21 09/06/2016    ANIONGAP 9 08/18/2022    ANIONGAP  9 09/06/2016    GLC 98 08/18/2022    GLC 77 10/12/2016    BUN 13 08/18/2022    BUN 11 09/06/2016    CR 0.93 08/18/2022    CR 0.78 09/06/2016    GFRESTIMATED >90 08/18/2022    GFRESTIMATED >90  Non  GFR Calc   09/06/2016    GFRESTBLACK >90   GFR Calc   09/06/2016    NATALYA 9.0 08/18/2022    NATALYA 8.9 09/06/2016        A1C RESULTS:  Lab Results   Component Value Date    A1C 5.4 08/19/2013       INR RESULTS:  Lab Results   Component Value Date    INR 1.6 (A) 12/30/2011    INR 1.3 (A) 12/27/2011    INR 1.31 (H) 12/20/2011    INR 1.11 12/19/2011       Results reviewed today.       Current Cardiac Medications     Aspirin 81 mg daily  Atorvastatin 40 mg daily   Losartan 25 mg daily  Metoprolol  mg daily  Nitroglycerin as needed  Effient 10 mg daily  Sildenafil as needed- has been educated on interaction with nitroglycerin  Spironolactone 25 mg daily                   Assessment and Plan:       Plan  Patient Instructions   Medication Changes:  6. INCREASE metoprolol XL to 150 daily for CM    Recommendations:  1. Check daily weights and call the clinic if your weight has increased more than 2 lbs in one day or 5 lbs in one week; if you feel more short of breath or have worsening swelling in your legs or abdomen.     Follow-up:  1. Echo in 2 months to reassess EF  2. See sadie TIAN for cardiology follow up at [facility]: Jan 2023.  Consider uptitrating metoprolol or losartan if EF remains low.  Consider EP consult for ICD if EF remains less than 35%  Call 6 months prior, to schedule.     Cardiology Scheduling~[phone]  Cardiology Clinic RN~[phone] (Sylvia RN, Jasmin RN, Rachael RN)                1. CAD    Multiple PCI's and STEMI 2/2022    No angina      Continue statin, aspirin, ARB, beta-blocker    Continue DAPT, uninterrupted, for at least 1 year (2/2023)-possibly longer given STEMI and stent to proximal LAD      2. Ischemic cardiomyopathy    Reduced EF    No signs of heart  failure     Continue ARB, beta-blocker, spironolactone    Reassess with echo after uptitrating medication    EP consult, to discuss ICD, if EF remains less than 35%      3. Hyperlipidemia    Last LDL 57 on 7/2022    Continue atorvastatin 40 mg daily      4. Hypertension    Controlled     Continue lisinopril, metoprolol, spironolactone      5.  Thoracic arctic dilatation    BP control 120/80    Follow with echo         Thank you for allowing me to participate in this delightful patient's care.      This note was completed in part using Dragon voice recognition software. Although reviewed after completion, some word and grammatical errors may occur.    Michaelle Styles, APRN CNP, APRN, CNP           Data:   All laboratory data reviewed           Constitutional:  cooperative, alert and oriented, well developed, well nourished, in no acute distress     Skin:  warm and dry to the touch         Head:  normocephalic       Eyes:  pupils equal and round       ENT:  no pallor or cyanosis       Neck:  no stiffness       Respiratory:  clear to auscultation; normal symmetry, decreased bilaterally        Cardiac: regular rate and rhythm; normal S1 and S2                 pulses full and equal, decreased heart sounds    GI:  abdomen soft, nondistended     Extremities and Muscular Skeletal:  no edema        Neurological:  affect appropriate; no gross motor deficits       Psych:  Alert and Oriented x 3 , appropriate affact

## 2022-11-09 NOTE — NURSING NOTE
Chief Complaint   Patient presents with     Coronary Artery Disease     1 month CAD and medication follow up     Refill Request     Wanting refill on nitroglycerin        There were no vitals filed for this visit.  Wt Readings from Last 1 Encounters:   09/22/22 89 kg (196 lb 3.2 oz)       Suzie Mcdowell MA

## 2022-11-09 NOTE — LETTER
11/9/2022    [facility]  [address] [address]    RE: Robbie Holley       Dear Colleague,     I had the pleasure of seeing Robbie Holley in the [facility].  Cardiology Clinic Progress Note  Robbie Holley MRN# [id number]   YOB: 1965 Age: 56 year old      Primary Cardiologist:   Dr. Álvarez          History of Presenting Illness:      Robbie Holley is a pleasant 56 year old  patient with a past cardiac history significant for   1. CAD    2000 Positive stress test-> PCI to the LAD    2013 PCI of circumflex    2017 PCI of pLAD     2/2022 anterior STEMI stent to pLAD, residual moderate disease  2. Ischemic cardiomyopathy    EF 25 to 30% at time of STEMI 2022    Improved to 30 to 35% 5/2022  3. Hypertension  4. Hyperlipidemia  5. Thoracic aortic dilatation    7/2022 ascending aorta 3.9 cm  Past medical history significant for prior tobacco use, current vaping, anxiety.      Patient was seen by Dr. Álvarez February 2022, for hospital follow-up.  He was trying to change his lifestyle drastically and was eating healthy and exercising regularly.  He was trying to quit smoking.  He was started on losartan for GDMT.    Patient was seen by me in July 2022.  Echocardiogram 7/2022 showed EF improved to 30 to 35% and continued with WMA's, ascending aorta 3.9 cm.  Cardiomyopathy medications were uptitrated.  Dr. Álvarez recommended waiting on EP for ICD at this point.  He was given heart failure education he was previously walking 2 miles per day but had not been doing this recently.   He quit smoking cigarettes but was still vaping and trying to cut back on the nicotine.    Patient was seen by me in August 2022.  Spironolactone was uptitrated to 25 mg daily for cardiomyopathy.  His weight was up 5 pounds in the last month and had not been checking weights at home as he did not purchase a scale yet.  He felt the weight gain was caloric and denied any heart failure  symptoms.    Patient was seen by me in September 2022 and metoprolol was uptitrated for cardiomyopathy.  He still had not purchased a scale, but weight in clinic was down 20 pounds with intentional weight loss and a goal of 175 to 180 pounds.   He cut out sodium and was walking daily.  He had quit vaping for a short period of time but returned to smoking 2 to 5 cigarettes/day instead.    Pt presents today for 1 month follow-up.  He denies any side effects after increasing metoprolol.  Blood pressure and heart rate are controlled today.  He is agreeable to further uptitrating metoprolol.  He states that he has been having more energy and is feeling well.  Weight is down another 10 pounds in the last 2 months with intentional weight loss and denies any heart failure symptoms.  He denies any angina.  He continues smoking 6 to 7 cigarettes/day but is motivated to quit.  He has been having difficulty getting his cardiology visits and testing covered with his insurance deductible.  He would like to figure this out, prior to scheduling his follow-up echo. Patient reports no chest pain, shortness of breath, PND, orthopnea, presyncope, syncope, edema, heart racing, or palpitations.    Labs:  LIPID RESULTS:  Lab Results   Component Value Date    CHOL 116 07/14/2022    CHOL 154 12/21/2016    HDL 44 07/14/2022    HDL 35 (L) 12/21/2016    LDL 57 07/14/2022    LDL 87 12/21/2016    TRIG 75 07/14/2022    TRIG 161 (H) 12/21/2016    CHOLHDLRATIO 4.0 01/21/2014       LIVER ENZYME RESULTS:  Lab Results   Component Value Date    AST 17 03/04/2022    AST 30 02/13/2008    ALT 47 07/14/2022    ALT 50 08/19/2013       CBC RESULTS:  Lab Results   Component Value Date    WBC 8.6 10/12/2016    RBC 5.26 10/12/2016    HGB 16.8 10/12/2016    HCT 47.9 10/12/2016    MCV 91 10/12/2016    MCH 31.9 10/12/2016    MCHC 35.1 10/12/2016    RDW 12.6 10/12/2016     10/12/2016       BMP RESULTS:  Lab Results   Component Value Date     08/18/2022      09/06/2016    POTASSIUM 4.4 08/18/2022    POTASSIUM 4.5 09/06/2016    CHLORIDE 102 08/18/2022    CHLORIDE 110 (H) 09/06/2016    CO2 25 08/18/2022    CO2 21 09/06/2016    ANIONGAP 9 08/18/2022    ANIONGAP 9 09/06/2016    GLC 98 08/18/2022    GLC 77 10/12/2016    BUN 13 08/18/2022    BUN 11 09/06/2016    CR 0.93 08/18/2022    CR 0.78 09/06/2016    GFRESTIMATED >90 08/18/2022    GFRESTIMATED >90  Non  GFR Calc   09/06/2016    GFRESTBLACK >90   GFR Calc   09/06/2016    NATALYA 9.0 08/18/2022    NATALYA 8.9 09/06/2016        A1C RESULTS:  Lab Results   Component Value Date    A1C 5.4 08/19/2013       INR RESULTS:  Lab Results   Component Value Date    INR 1.6 (A) 12/30/2011    INR 1.3 (A) 12/27/2011    INR 1.31 (H) 12/20/2011    INR 1.11 12/19/2011       Results reviewed today.       Current Cardiac Medications     Aspirin 81 mg daily  Atorvastatin 40 mg daily   Losartan 25 mg daily  Metoprolol  mg daily  Nitroglycerin as needed  Effient 10 mg daily  Sildenafil as needed- has been educated on interaction with nitroglycerin  Spironolactone 25 mg daily                   Assessment and Plan:       Plan  Patient Instructions   Medication Changes:  6. INCREASE metoprolol XL to 150 daily for CM    Recommendations:  1. Check daily weights and call the clinic if your weight has increased more than 2 lbs in one day or 5 lbs in one week; if you feel more short of breath or have worsening swelling in your legs or abdomen.     Follow-up:  1. Echo in 2 months to reassess EF  2. See sadie TIAN for cardiology follow up at [facility]: Jan 2023.  Consider uptitrating metoprolol or losartan if EF remains low.  Consider EP consult for ICD if EF remains less than 35%  Call 6 months prior, to schedule.     Cardiology Scheduling~[phone]  Cardiology Clinic RN~[phone] (Sylvia RN, Jasmin RN, Rachael RN)                1. CAD    Multiple PCI's and STEMI 2/2022    No angina      Continue statin,  aspirin, ARB, beta-blocker    Continue DAPT, uninterrupted, for at least 1 year (2/2023)-possibly longer given STEMI and stent to proximal LAD      2. Ischemic cardiomyopathy    Reduced EF    No signs of heart failure     Continue ARB, beta-blocker, spironolactone    Reassess with echo after uptitrating medication    EP consult, to discuss ICD, if EF remains less than 35%      3. Hyperlipidemia    Last LDL 57 on 7/2022    Continue atorvastatin 40 mg daily      4. Hypertension    Controlled     Continue lisinopril, metoprolol, spironolactone      5.  Thoracic arctic dilatation    BP control 120/80    Follow with echo         Thank you for allowing me to participate in this delightful patient's care.      This note was completed in part using Dragon voice recognition software. Although reviewed after completion, some word and grammatical errors may occur.    JEANIE Medrano CNP, APRN, CNP           Data:   All laboratory data reviewed           Constitutional:  cooperative, alert and oriented, well developed, well nourished, in no acute distress     Skin:  warm and dry to the touch         Head:  normocephalic       Eyes:  pupils equal and round       ENT:  no pallor or cyanosis       Neck:  no stiffness       Respiratory:  clear to auscultation; normal symmetry, decreased bilaterally        Cardiac: regular rate and rhythm; normal S1 and S2                 pulses full and equal, decreased heart sounds    GI:  abdomen soft, nondistended     Extremities and Muscular Skeletal:  no edema        Neurological:  affect appropriate; no gross motor deficits       Psych:  Alert and Oriented x 3 , appropriate affact    Thank you for allowing me to participate in the care of your patient.      Sincerely,     JEANIE Medrano CNP     [facility]  cc:   JEANIE Scott CNP  [address]  [place]  [address]

## 2022-11-09 NOTE — PATIENT INSTRUCTIONS
Medication Changes:  INCREASE metoprolol XL to 150 daily     Recommendations:  Check daily weights and call the clinic if your weight has increased more than 2 lbs in one day or 5 lbs in one week; if you feel more short of breath or have worsening swelling in your legs or abdomen.     Follow-up:  Echo in 2 months   See sadie TIAN for cardiology follow up at [facility]: Jan 2023.   Call 6 months prior, to schedule.     Cardiology Scheduling~[phone]  Cardiology Clinic RN~[phone] (Sylvia RN, Jasmin RN, Rachael RN)

## 2023-06-16 DIAGNOSIS — I25.5 ISCHEMIC CARDIOMYOPATHY: ICD-10-CM

## 2023-06-16 RX ORDER — SPIRONOLACTONE 25 MG/1
25 TABLET ORAL DAILY
Qty: 90 TABLET | Refills: 0 | Status: SHIPPED | OUTPATIENT
Start: 2023-06-16

## 2023-06-16 NOTE — TELEPHONE ENCOUNTER
[facility] Cardiology Refill Guideline reviewed.  Medication meets criteria for refill. 90 day fill given. No longer seeing cardiology at this location so after this refill will no longer provide refills. Pt notified. Sylvia Lee RN Cardiology June 16, 2023, 10:12 AM

## 2023-06-16 NOTE — TELEPHONE ENCOUNTER
Last Office Visit: 11/9/22  Next Office Visit: None scheduled   Last Fill Date: 5/17/23    Marilia Guthrie CMA 6/16/2023 9:59 AM

## 2024-03-06 DIAGNOSIS — I25.10 CORONARY ARTERY DISEASE INVOLVING NATIVE CORONARY ARTERY OF NATIVE HEART WITHOUT ANGINA PECTORIS: ICD-10-CM

## 2024-03-06 RX ORDER — NITROGLYCERIN 0.4 MG/1
0.4 TABLET SUBLINGUAL
Qty: 25 TABLET | Refills: 0 | Status: SHIPPED | OUTPATIENT
Start: 2024-03-06 | End: 2024-04-17

## 2024-03-06 NOTE — TELEPHONE ENCOUNTER
[facility] Cardiology Refill Guideline reviewed.  Medication does not meet criteria for refill due to overdue for follow up.  Messaged to providers team for further review. #25 given and pt asked to call for appt. Sylvia Lee RN Cardiology March 6, 2024, 2:12 PM

## 2024-03-06 NOTE — TELEPHONE ENCOUNTER
Last Office Visit: 11/09/22 CATHRYN Barker  Next Office Visit: TBD  Last Fill Date: 05/10/23  Diandra Gonzales MA Cardiology   3/6/2024 2:04 PM

## 2024-04-17 DIAGNOSIS — I25.10 CORONARY ARTERY DISEASE INVOLVING NATIVE CORONARY ARTERY OF NATIVE HEART WITHOUT ANGINA PECTORIS: ICD-10-CM

## 2024-04-17 NOTE — TELEPHONE ENCOUNTER
Last Office Visit: 11/09/22 CATHRYN Barker  Next Office Visit: TBD  Last Fill Date: 03/06/24  Diandra Gonzales MA Cardiology   4/17/2024 4:29 PM

## 2024-04-18 RX ORDER — NITROGLYCERIN 0.4 MG/1
0.4 TABLET SUBLINGUAL
Qty: 15 TABLET | Refills: 0 | Status: SHIPPED | OUTPATIENT
Start: 2024-04-18

## 2024-04-18 NOTE — TELEPHONE ENCOUNTER
[facility] Cardiology Refill Guideline reviewed.  Medication does not meet criteria for refill due to Pt is over due to be seen. Leia refill of 25 tabs given 1 month ago. No additional refills will be granted until pt is seen.  Messaged to providers team for further review.     Afshan Miles RN

## 2024-07-23 NOTE — TELEPHONE ENCOUNTER
Closing Encounter, Pt being seen by [facility]     Patient discharging, IV and tele removed, belongings returned. A [facility] representative tried to interview this patient for a potential admission but he declined treatment. At this point he is cleared for discharge from internal medicine stand point. Patient's mother Lisa Mary was notified as well and offered to help if we need anything from her. Medications and follow up appointments discussed, all questions answered. Emphasis was put on the importance of taking his medication for seizures. Additional patient education given. Patient transporting in private vehicle, accompanied by his spouse.

## 2025-06-08 LAB
ALBUMIN SERPL BCG-MCNC: 4.1 G/DL (ref 3.5–5.2)
ALP SERPL-CCNC: 96 U/L (ref 40–150)
ALT SERPL W P-5'-P-CCNC: 44 U/L (ref 0–70)
ANION GAP SERPL CALCULATED.3IONS-SCNC: 8 MMOL/L (ref 7–15)
AST SERPL W P-5'-P-CCNC: 25 U/L (ref 0–45)
BASOPHILS # BLD AUTO: 0 10E3/UL (ref 0–0.2)
BASOPHILS NFR BLD AUTO: 1 %
BILIRUB SERPL-MCNC: 0.3 MG/DL
BUN SERPL-MCNC: 18.3 MG/DL (ref 8–23)
CALCIUM SERPL-MCNC: 9.2 MG/DL (ref 8.8–10.4)
CHLORIDE SERPL-SCNC: 107 MMOL/L (ref 98–107)
CREAT SERPL-MCNC: 0.88 MG/DL (ref 0.67–1.17)
EGFRCR SERPLBLD CKD-EPI 2021: >90 ML/MIN/1.73M2
EOSINOPHIL # BLD AUTO: 0.3 10E3/UL (ref 0–0.7)
EOSINOPHIL NFR BLD AUTO: 4 %
ERYTHROCYTE [DISTWIDTH] IN BLOOD BY AUTOMATED COUNT: 13 % (ref 10–15)
GLUCOSE SERPL-MCNC: 108 MG/DL (ref 70–99)
HCO3 SERPL-SCNC: 24 MMOL/L (ref 22–29)
HCT VFR BLD AUTO: 50.7 % (ref 40–53)
HGB BLD-MCNC: 17.2 G/DL (ref 13.3–17.7)
IMM GRANULOCYTES # BLD: 0 10E3/UL
IMM GRANULOCYTES NFR BLD: 0 %
LYMPHOCYTES # BLD AUTO: 1.7 10E3/UL (ref 0.8–5.3)
LYMPHOCYTES NFR BLD AUTO: 26 %
MCH RBC QN AUTO: 31.8 PG (ref 26.5–33)
MCHC RBC AUTO-ENTMCNC: 33.9 G/DL (ref 31.5–36.5)
MCV RBC AUTO: 94 FL (ref 78–100)
MONOCYTES # BLD AUTO: 0.6 10E3/UL (ref 0–1.3)
MONOCYTES NFR BLD AUTO: 10 %
NEUTROPHILS # BLD AUTO: 3.9 10E3/UL (ref 1.6–8.3)
NEUTROPHILS NFR BLD AUTO: 59 %
NRBC # BLD AUTO: 0 10E3/UL
NRBC BLD AUTO-RTO: 0 /100
PLATELET # BLD AUTO: 167 10E3/UL (ref 150–450)
POTASSIUM SERPL-SCNC: 4.5 MMOL/L (ref 3.4–5.3)
PROT SERPL-MCNC: 6.4 G/DL (ref 6.4–8.3)
RBC # BLD AUTO: 5.41 10E6/UL (ref 4.4–5.9)
SODIUM SERPL-SCNC: 139 MMOL/L (ref 135–145)
TROPONIN T SERPL HS-MCNC: 8 NG/L
WBC # BLD AUTO: 6.6 10E3/UL (ref 4–11)

## 2025-06-08 PROCEDURE — 99284 EMERGENCY DEPT VISIT MOD MDM: CPT

## 2025-06-08 PROCEDURE — 84484 ASSAY OF TROPONIN QUANT: CPT | Performed by: STUDENT IN AN ORGANIZED HEALTH CARE EDUCATION/TRAINING PROGRAM

## 2025-06-08 PROCEDURE — 80053 COMPREHEN METABOLIC PANEL: CPT | Performed by: STUDENT IN AN ORGANIZED HEALTH CARE EDUCATION/TRAINING PROGRAM

## 2025-06-08 PROCEDURE — 93005 ELECTROCARDIOGRAM TRACING: CPT | Performed by: STUDENT IN AN ORGANIZED HEALTH CARE EDUCATION/TRAINING PROGRAM

## 2025-06-08 PROCEDURE — 36415 COLL VENOUS BLD VENIPUNCTURE: CPT | Performed by: STUDENT IN AN ORGANIZED HEALTH CARE EDUCATION/TRAINING PROGRAM

## 2025-06-08 PROCEDURE — 85025 COMPLETE CBC W/AUTO DIFF WBC: CPT | Performed by: STUDENT IN AN ORGANIZED HEALTH CARE EDUCATION/TRAINING PROGRAM

## 2025-06-08 ASSESSMENT — COLUMBIA-SUICIDE SEVERITY RATING SCALE - C-SSRS
2. HAVE YOU ACTUALLY HAD ANY THOUGHTS OF KILLING YOURSELF IN THE PAST MONTH?: NO
6. HAVE YOU EVER DONE ANYTHING, STARTED TO DO ANYTHING, OR PREPARED TO DO ANYTHING TO END YOUR LIFE?: NO
1. IN THE PAST MONTH, HAVE YOU WISHED YOU WERE DEAD OR WISHED YOU COULD GO TO SLEEP AND NOT WAKE UP?: NO

## 2025-06-09 ENCOUNTER — HOSPITAL ENCOUNTER (EMERGENCY)
Facility: HOSPITAL | Age: 60
Discharge: HOME | End: 2025-06-09
Attending: STUDENT IN AN ORGANIZED HEALTH CARE EDUCATION/TRAINING PROGRAM | Admitting: STUDENT IN AN ORGANIZED HEALTH CARE EDUCATION/TRAINING PROGRAM
Payer: COMMERCIAL

## 2025-06-09 VITALS
DIASTOLIC BLOOD PRESSURE: 82 MMHG | RESPIRATION RATE: 20 BRPM | TEMPERATURE: 98.3 F | HEART RATE: 75 BPM | SYSTOLIC BLOOD PRESSURE: 121 MMHG | OXYGEN SATURATION: 98 %

## 2025-06-09 DIAGNOSIS — R07.89 ATYPICAL CHEST PAIN: ICD-10-CM

## 2025-06-09 LAB
ATRIAL RATE - MUSE: 80 BPM
DIASTOLIC BLOOD PRESSURE - MUSE: NORMAL MMHG
INTERPRETATION ECG - MUSE: NORMAL
P AXIS - MUSE: 70 DEGREES
PR INTERVAL - MUSE: 178 MS
QRS DURATION - MUSE: 148 MS
QT - MUSE: 412 MS
QTC - MUSE: 475 MS
R AXIS - MUSE: -87 DEGREES
SYSTOLIC BLOOD PRESSURE - MUSE: NORMAL MMHG
T AXIS - MUSE: 62 DEGREES
TROPONIN T SERPL HS-MCNC: 10 NG/L
VENTRICULAR RATE- MUSE: 80 BPM

## 2025-06-09 PROCEDURE — 84484 ASSAY OF TROPONIN QUANT: CPT | Performed by: STUDENT IN AN ORGANIZED HEALTH CARE EDUCATION/TRAINING PROGRAM

## 2025-06-09 PROCEDURE — 36415 COLL VENOUS BLD VENIPUNCTURE: CPT | Performed by: STUDENT IN AN ORGANIZED HEALTH CARE EDUCATION/TRAINING PROGRAM

## 2025-06-09 ASSESSMENT — ACTIVITIES OF DAILY LIVING (ADL): ADLS_ACUITY_SCORE: 41

## 2025-06-09 NOTE — ED TRIAGE NOTES
Hamilton reports left sided chest pain, he reports he also has neck pain that he has had and is being seen for as an outpatient he thought maybe the pain was related to his neck numbness he has had before but it felt different to he presented to the ED.

## 2025-06-09 NOTE — ED PROVIDER NOTES
"  Emergency Department Encounter         FINAL IMPRESSION:  Atypical chest pain          ED COURSE AND MEDICAL DECISION MAKING       ED Course as of 06/09/25 0301   Sun Jun 08, 2025   2318 EKG is sinus rate of 80, bundle branch block pattern present, no STEMI or signs of obvious ischemia   Mon Jun 09, 2025   0019 59-year-old male history of cardiac disease with multiple cardiac stents, hypertension, hyperlipidemia, here from home with nonspecific atypical left anterior chest discomfort.  Ports he has been struggling currently with neck discomfort/cervical spine disease with chronic left arm paresthesias for months.  Stated tonight he used a stim machine for the first time as well as exercise for the first time today including doing push-ups.  States while getting ready for bed he had left anterior chest \"weird feeling\" with no other associated symptoms including diaphoresis, nausea shortness of breath presyncope or palpitations.  States that he has quite amount of anxiety regarding his cardiac disease and wanted to come in for evaluation.  Pain did not radiate to his neck or back.  No abdominal pain.  Has doing well this week otherwise with no exertional dyspnea or any other signs or symptoms suggesting typical or atypical ACS.  No leg swelling.  Low concern for PE.  His examination is unremarkable.  EKG as above.  Plan for [facility] and discharge home patient does endorse pain is better/worse with movement and twisting.  I suspect musculoskeletal component.   0148 Second troponin negative.  Plan for discharge home.  Patient declining chest x-ray.     Patient asymptomatic on discharge.           Medical Decision Making      Discharge. No recommendations on prescription strength medication(s). See documentation for any additional details.    MIPS (CTPE, Dental pain, Fuentes, Sinusitis, Asthma/COPD, Head Trauma): Not Applicable    SEPSIS: None            EKG  EKG is sinus rate of 80, bundle branch block pattern " "present, no STEMI or signs of obvious ischemia        At the conclusion of the encounter I discussed the results of all the tests and the disposition. The questions were answered. The patient or family acknowledged understanding and was agreeable with the care plan.        MEDICATIONS GIVEN IN THE EMERGENCY DEPARTMENT:  Medications - No data to display    NEW PRESCRIPTIONS STARTED AT TODAY'S ED VISIT:  New Prescriptions    No medications on file       HPI     Patient information obtained from: patient    Use of : N/A    Robbie Holley is a 59 year old male with a pertinent history of multiple cardiac stents, hypertension, and hyperlipidemia who presents to this ED walking for evaluation of chest pain.    Per patient, he has been struggling with neck discomfort and cervical spine disease with chronic left arm paresthesias for months. Today (06/08/2026-06/09/2025) he used a stim machine and exercised for the first time. Before going to bed he had a \"weird feeling\" in his left anterior chest. He came in because he has anxiety regarding his cardiac disease and the \"weird feeling\".     He has had no diaphoresis, nausea, shortness of breath, presyncope, palpitations, abdominal pain, or leg swelling. He did not mention taking anything for his symptoms. No daily medications were mentioned.        MEDICAL HISTORY     Past Medical History:   Diagnosis Date    Myocardial infarction (H)        Past Surgical History:   Procedure Laterality Date    ARTHROPLASTY HIP  12/19/2011    Procedure:ARTHROPLASTY HIP; Left Total Hip Arthroplasty; Surgeon:RANDY BANKS; Location:[facility] OR    ARTHROSCOPY KNEE WITH LATERAL MENISCECTOMY  3/1/2013    Procedure: ARTHROSCOPY KNEE WITH LATERAL MENISCECTOMY;  Right Knee Arthroscopy & Lateral Meniscectomy & Microfracture;  Surgeon: Randy Banks MD;  Location: [facility] OR    C UNLISTED PROCEDURE, ABDOMEN/PERITONEUM/OMENTUM      Description: Hernia Repair;  Recorded: 01/15/2013;    CARDIAC " CATHETERIZATION      CORONARY STENT PLACEMENT      CV CORONARY ANGIOGRAM N/A 12/4/2017    Procedure: Coronary Angiogram;  Surgeon: Becca Goddard MD;  Location: [facility] Cath Lab;  Service:     CV LEFT HEART CATHETERIZATION WITH LEFT VENTRICULOGRAM N/A 12/4/2017    Procedure: Left Heart Catheterization with Left Ventriculogram;  Surgeon: Becca Goddard MD;  Location: [facility] Cath Lab;  Service:     HERNIA REPAIR, INGUINAL RT/LT  11/20/06    Left inguinal hernia repair. Dr Garza    SURGICAL HISTORY OF -   3/10/03    Left knee meniscal tear repair    SURGICAL HISTORY OF -       elbow     SURGICAL HISTORY OF -       hernia    SURGICAL HISTORY OF -   2001    cardiac stent    [facility] STRESS ECHO ([place]) [place]  4/22/02       Social History     Tobacco Use    Smoking status: Every Day     Current packs/day: 0.00     Average packs/day: 0.3 packs/day for 25.0 years (6.3 ttl pk-yrs)     Types: Cigarettes     Start date: 1/1/1997     Last attempt to quit: 1/1/2022     Years since quitting: 3.4    Smokeless tobacco: Never    Tobacco comments:     6-7 cigarettes a day   Vaping Use    Vaping status: Every Day    Substances: Nicotine    Devices: [employer]   Substance Use Topics    Alcohol use: Yes     Alcohol/week: 0.0 standard drinks of alcohol     Comment: twice monthly    Drug use: No       aspirin 81 MG tablet  atorvastatin (LIPITOR) 80 MG tablet  clonazePAM (KLONOPIN) 1 MG tablet  losartan (COZAAR) 25 MG tablet  metoprolol succinate ER (TOPROL XL) 100 MG 24 hr tablet  nitroGLYcerin (NITROSTAT) 0.4 MG sublingual tablet  prasugrel (EFFIENT) 10 MG TABS tablet  sildenafil (REVATIO/VIAGRA) 20 MG tablet  spironolactone (ALDACTONE) 25 MG tablet  zolpidem (AMBIEN) 10 MG tablet            PHYSICAL EXAM     /79   Pulse 79   Temp 98.3  F (36.8  C) (Temporal)   Resp 20   SpO2 99%       PHYSICAL EXAM:     General: Patient appears well, nontoxic, comfortable  HEENT: Moist mucous membranes,  No head trauma.     Cardiovascular: Normal rate, normal rhythm, no extremity edema.  No appreciable murmur.  Respiratory: No signs of respiratory distress, lungs are clear to auscultation bilaterally with no wheezes rhonchi or rales.  Abdominal: Soft, nontender, nondistended, no palpable masses, no guarding, no rebound  Musculoskeletal: Full range of motion of joints, no deformities appreciated.  Neurological: Alert and oriented, grossly neurologically intact.  Psychological: Normal affect and mood.  Integument: No rashes appreciated        RESULTS       Labs Ordered and Resulted from Time of ED Arrival to Time of ED Departure   COMPREHENSIVE METABOLIC PANEL - Abnormal       Result Value    Sodium 139      Potassium 4.5      Carbon Dioxide (CO2) 24      Anion Gap 8      Urea Nitrogen 18.3      Creatinine 0.88      GFR Estimate >90      Calcium 9.2      Chloride 107      Glucose 108 (*)     Alkaline Phosphatase 96      AST 25      ALT 44      Protein Total 6.4      Albumin 4.1      Bilirubin Total 0.3     TROPONIN T, HIGH SENSITIVITY - Normal    Troponin T, High Sensitivity 8     CBC WITH PLATELETS AND DIFFERENTIAL    WBC Count 6.6      RBC Count 5.41      Hemoglobin 17.2      Hematocrit 50.7      MCV 94      MCH 31.8      MCHC 33.9      RDW 13.0      Platelet Count 167      % Neutrophils 59      % Lymphocytes 26      % Monocytes 10      % Eosinophils 4      % Basophils 1      % Immature Granulocytes 0      NRBCs per 100 WBC 0      Absolute Neutrophils 3.9      Absolute Lymphocytes 1.7      Absolute Monocytes 0.6      Absolute Eosinophils 0.3      Absolute Basophils 0.0      Absolute Immature Granulocytes 0.0      Absolute NRBCs 0.0     TROPONIN T, HIGH SENSITIVITY       No orders to display         PROCEDURES:  Procedures:  Procedures       Dennis TARIQ am serving as a scribe to document services personally performed by Washington Castillo DO, based on my observations and the provider's statements to me.  I, Washington Castillo DO, attest that Dennis  Mino is acting in a scribe capacity, has observed my performance of the services and has documented them in accordance with my direction.    Washington Castillo DO  Emergency Medicine  [facility] EMERGENCY DEPARTMENT      Jonathan, Washington Godoy DO  06/09/25 [id number]

## 2025-09-02 ENCOUNTER — APPOINTMENT (OUTPATIENT)
Dept: CT IMAGING | Facility: CLINIC | Age: 60
End: 2025-09-02
Attending: EMERGENCY MEDICINE
Payer: COMMERCIAL

## 2025-09-02 ENCOUNTER — HOSPITAL ENCOUNTER (EMERGENCY)
Facility: CLINIC | Age: 60
Discharge: HOME | End: 2025-09-02
Attending: EMERGENCY MEDICINE | Admitting: EMERGENCY MEDICINE
Payer: COMMERCIAL

## 2025-09-02 VITALS
HEART RATE: 64 BPM | SYSTOLIC BLOOD PRESSURE: 125 MMHG | BODY MASS INDEX: 30.8 KG/M2 | RESPIRATION RATE: 20 BRPM | TEMPERATURE: 98 F | DIASTOLIC BLOOD PRESSURE: 93 MMHG | HEIGHT: 71 IN | OXYGEN SATURATION: 98 % | WEIGHT: 220 LBS

## 2025-09-02 DIAGNOSIS — M89.8X1 PAIN OF RIGHT SCAPULA: ICD-10-CM
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
## 2025-09-02 DIAGNOSIS — S09.90XA CLOSED HEAD INJURY, INITIAL ENCOUNTER: ICD-10-CM

## 2025-09-02 DIAGNOSIS — R20.2 PARESTHESIAS: ICD-10-CM

## 2025-09-02 LAB
ANION GAP SERPL CALCULATED.3IONS-SCNC: 8 MMOL/L (ref 7–15)
BASOPHILS # BLD AUTO: 0.04 10E3/UL (ref 0–0.2)
BASOPHILS NFR BLD AUTO: 0.6 %
BUN SERPL-MCNC: 13.7 MG/DL (ref 8–23)
CALCIUM SERPL-MCNC: 8.2 MG/DL (ref 8.8–10.4)
CHLORIDE SERPL-SCNC: 106 MMOL/L (ref 98–107)
CREAT SERPL-MCNC: 0.92 MG/DL (ref 0.67–1.17)
EGFRCR SERPLBLD CKD-EPI 2021: >90 ML/MIN/1.73M2
EOSINOPHIL # BLD AUTO: 0.12 10E3/UL (ref 0–0.7)
EOSINOPHIL NFR BLD AUTO: 1.7 %
ERYTHROCYTE [DISTWIDTH] IN BLOOD BY AUTOMATED COUNT: 12.5 % (ref 10–15)
GLUCOSE SERPL-MCNC: 117 MG/DL (ref 70–99)
HCO3 SERPL-SCNC: 22 MMOL/L (ref 22–29)
HCT VFR BLD AUTO: 48.7 % (ref 40–53)
HGB BLD-MCNC: 16.8 G/DL (ref 13.3–17.7)
IMM GRANULOCYTES # BLD: <0.03 10E3/UL
IMM GRANULOCYTES NFR BLD: 0.3 %
LYMPHOCYTES # BLD AUTO: 1.57 10E3/UL (ref 0.8–5.3)
LYMPHOCYTES NFR BLD AUTO: 22.7 %
MCH RBC QN AUTO: 32.4 PG (ref 26.5–33)
MCHC RBC AUTO-ENTMCNC: 34.5 G/DL (ref 31.5–36.5)
MCV RBC AUTO: 93.8 FL (ref 78–100)
MONOCYTES # BLD AUTO: 0.41 10E3/UL (ref 0–1.3)
MONOCYTES NFR BLD AUTO: 5.9 %
NEUTROPHILS # BLD AUTO: 4.76 10E3/UL (ref 1.6–8.3)
NEUTROPHILS NFR BLD AUTO: 68.8 %
NRBC # BLD AUTO: <0.03 10E3/UL
NRBC BLD AUTO-RTO: 0 /100
PLATELET # BLD AUTO: 147 10E3/UL (ref 150–450)
POTASSIUM SERPL-SCNC: 4.4 MMOL/L (ref 3.4–5.3)
RBC # BLD AUTO: 5.19 10E6/UL (ref 4.4–5.9)
SODIUM SERPL-SCNC: 136 MMOL/L (ref 135–145)
WBC # BLD AUTO: 6.92 10E3/UL (ref 4–11)

## 2025-09-02 PROCEDURE — 250N000011 HC RX IP 250 OP 636: Performed by: EMERGENCY MEDICINE

## 2025-09-02 PROCEDURE — 96375 TX/PRO/DX INJ NEW DRUG ADDON: CPT

## 2025-09-02 PROCEDURE — 250N000009 HC RX 250: Performed by: EMERGENCY MEDICINE

## 2025-09-02 PROCEDURE — 72128 CT CHEST SPINE W/O DYE: CPT

## 2025-09-02 PROCEDURE — 96374 THER/PROPH/DIAG INJ IV PUSH: CPT | Mod: 59

## 2025-09-02 PROCEDURE — 71260 CT THORAX DX C+: CPT

## 2025-09-02 PROCEDURE — 36415 COLL VENOUS BLD VENIPUNCTURE: CPT | Performed by: EMERGENCY MEDICINE

## 2025-09-02 PROCEDURE — 255N000002 HC RX 255 OP 636: Performed by: EMERGENCY MEDICINE

## 2025-09-02 PROCEDURE — 80051 ELECTROLYTE PANEL: CPT | Performed by: EMERGENCY MEDICINE

## 2025-09-02 PROCEDURE — 99285 EMERGENCY DEPT VISIT HI MDM: CPT | Mod: 25 | Performed by: EMERGENCY MEDICINE

## 2025-09-02 PROCEDURE — 96376 TX/PRO/DX INJ SAME DRUG ADON: CPT

## 2025-09-02 PROCEDURE — 72125 CT NECK SPINE W/O DYE: CPT

## 2025-09-02 PROCEDURE — 85041 AUTOMATED RBC COUNT: CPT | Performed by: EMERGENCY MEDICINE

## 2025-09-02 PROCEDURE — 70450 CT HEAD/BRAIN W/O DYE: CPT

## 2025-09-02 RX ORDER — HYDROMORPHONE HYDROCHLORIDE 1 MG/ML
0.5 INJECTION, SOLUTION INTRAMUSCULAR; INTRAVENOUS; SUBCUTANEOUS EVERY 30 MIN PRN
Refills: 0 | Status: DISCONTINUED | OUTPATIENT
Start: 2025-09-02 | End: 2025-09-02 | Stop reason: HOSPADM

## 2025-09-02 RX ORDER — OXYCODONE HYDROCHLORIDE 5 MG/1
5 TABLET ORAL EVERY 6 HOURS PRN
Qty: 10 TABLET | Refills: 0 | Status: SHIPPED | OUTPATIENT
Start: 2025-09-02

## 2025-09-02 RX ADMIN — IOHEXOL 113 ML: 350 INJECTION, SOLUTION INTRAVENOUS at 13:12

## 2025-09-02 RX ADMIN — SODIUM CHLORIDE 68 ML: 9 INJECTION, SOLUTION INTRAVENOUS at 13:12

## 2025-09-02 RX ADMIN — MIDAZOLAM 1 MG: 1 INJECTION INTRAMUSCULAR; INTRAVENOUS at 12:54

## 2025-09-02 RX ADMIN — HYDROMORPHONE HYDROCHLORIDE 0.5 MG: 1 INJECTION, SOLUTION INTRAMUSCULAR; INTRAVENOUS; SUBCUTANEOUS at 12:57

## 2025-09-02 RX ADMIN — HYDROMORPHONE HYDROCHLORIDE 0.5 MG: 1 INJECTION, SOLUTION INTRAMUSCULAR; INTRAVENOUS; SUBCUTANEOUS at 14:22

## 2025-09-02 ASSESSMENT — ACTIVITIES OF DAILY LIVING (ADL)
ADLS_ACUITY_SCORE: 41
ADLS_ACUITY_SCORE: 41

## 2025-09-02 ASSESSMENT — COLUMBIA-SUICIDE SEVERITY RATING SCALE - C-SSRS
1. IN THE PAST MONTH, HAVE YOU WISHED YOU WERE DEAD OR WISHED YOU COULD GO TO SLEEP AND NOT WAKE UP?: NO
2. HAVE YOU ACTUALLY HAD ANY THOUGHTS OF KILLING YOURSELF IN THE PAST MONTH?: NO
6. HAVE YOU EVER DONE ANYTHING, STARTED TO DO ANYTHING, OR PREPARED TO DO ANYTHING TO END YOUR LIFE?: NO

## 2025-09-03 ENCOUNTER — HOSPITAL ENCOUNTER (EMERGENCY)
Facility: CLINIC | Age: 60
Discharge: HOME | End: 2025-09-03
Attending: FAMILY MEDICINE | Admitting: FAMILY MEDICINE
Payer: COMMERCIAL

## 2025-09-03 VITALS
TEMPERATURE: 97.9 F | OXYGEN SATURATION: 94 % | SYSTOLIC BLOOD PRESSURE: 107 MMHG | WEIGHT: 220 LBS | DIASTOLIC BLOOD PRESSURE: 85 MMHG | BODY MASS INDEX: 31.5 KG/M2 | HEIGHT: 70 IN | HEART RATE: 64 BPM

## 2025-09-03 DIAGNOSIS — S09.90XD CLOSED HEAD INJURY, SUBSEQUENT ENCOUNTER: Primary | ICD-10-CM

## 2025-09-03 DIAGNOSIS — M25.511 ACUTE PAIN OF RIGHT SHOULDER: ICD-10-CM

## 2025-09-03 DIAGNOSIS — M54.2 ACUTE NECK PAIN: ICD-10-CM

## 2025-09-03 PROCEDURE — 99283 EMERGENCY DEPT VISIT LOW MDM: CPT | Performed by: FAMILY MEDICINE

## 2025-09-03 PROCEDURE — 99282 EMERGENCY DEPT VISIT SF MDM: CPT | Performed by: FAMILY MEDICINE

## 2025-09-03 ASSESSMENT — ACTIVITIES OF DAILY LIVING (ADL): ADLS_ACUITY_SCORE: 41
